# Patient Record
Sex: MALE | Race: WHITE | NOT HISPANIC OR LATINO | Employment: OTHER | URBAN - METROPOLITAN AREA
[De-identification: names, ages, dates, MRNs, and addresses within clinical notes are randomized per-mention and may not be internally consistent; named-entity substitution may affect disease eponyms.]

---

## 2017-03-07 ENCOUNTER — HOSPITAL ENCOUNTER (OUTPATIENT)
Dept: CT IMAGING | Facility: HOSPITAL | Age: 67
Discharge: HOME | End: 2017-03-07
Attending: FAMILY MEDICINE | Admitting: FAMILY MEDICINE

## 2017-03-07 LAB — CREAT BLDA-MCNC: 0.8 MG/DL (ref 0.6–1.3)

## 2017-04-03 ENCOUNTER — HOSPITAL ENCOUNTER (OUTPATIENT)
Dept: PREADMISSION TESTING | Facility: HOSPITAL | Age: 67
Discharge: HOME | End: 2017-04-03
Attending: OTOLARYNGOLOGY | Admitting: OTOLARYNGOLOGY

## 2017-04-03 LAB
ANION GAP SERPL CALC-SCNC: 12.8 MMOL/L (ref 10–20)
APTT BLD: 23.5 SEC (ref 24–31)
BASOPHILS # BLD AUTO: 0 10*3/UL (ref 0–0.2)
BASOPHILS NFR BLD AUTO: 1 % (ref 0–2)
BUN SERPL-MCNC: 16 MG/DL (ref 8–20)
BUN/CREAT SERPL: 17.8 (ref 6.2–20.3)
CALCIUM SERPL-MCNC: 10.6 MG/DL (ref 8.9–10.3)
CHLORIDE SERPL-SCNC: 104 MMOL/L (ref 101–111)
CONV CO2: 27 MMOL/L (ref 22–32)
CREAT UR-MCNC: 0.9 MG/DL (ref 0.7–1.2)
DIFFERENTIAL METHOD BLD: (no result)
EOSINOPHIL # BLD AUTO: 0.2 10*3/UL (ref 0–0.3)
EOSINOPHIL # BLD AUTO: 3 % (ref 0–3)
ERYTHROCYTE [DISTWIDTH] IN BLOOD BY AUTOMATED COUNT: 13.3 % (ref 11.5–14.5)
GLUCOSE SERPL-MCNC: 93 MG/DL (ref 65–99)
HCT VFR BLD AUTO: 42.8 % (ref 40–54)
HGB BLD-MCNC: 15.1 G/DL (ref 14–18)
INR PPP: 1
LYMPHOCYTES # BLD AUTO: 2.5 10*3/UL (ref 0.8–4.8)
LYMPHOCYTES NFR BLD AUTO: 34 % (ref 18–42)
MCH RBC QN AUTO: 30.7 PG (ref 26–32)
MCHC RBC AUTO-ENTMCNC: 35.2 G/DL (ref 32–36)
MCV RBC AUTO: 87.1 FL (ref 80–94)
MONOCYTES # BLD AUTO: 0.8 10*3/UL (ref 0.1–1.3)
MONOCYTES NFR BLD AUTO: 10 % (ref 2–11)
NEUTROPHILS # BLD AUTO: 4 10*3/UL (ref 2.3–8.6)
NEUTROPHILS NFR BLD AUTO: 52 % (ref 50–75)
NRBC BLD AUTO-RTO: 0 /100{WBCS}
NRBC/RBC NFR BLD MANUAL: 0 10*3/UL
PLATELET # BLD AUTO: 160 10*3/UL (ref 150–450)
PMV BLD AUTO: 9 FL (ref 7.4–10.4)
POTASSIUM SERPL-SCNC: 3.8 MMOL/L (ref 3.6–5.1)
PROTHROMBIN TIME: 11.3 SEC (ref 9.6–11.7)
RBC # BLD AUTO: 4.92 10*6/UL (ref 4.6–6)
SODIUM SERPL-SCNC: 140 MMOL/L (ref 136–144)
WBC # BLD AUTO: 7.5 10*3/UL (ref 4.5–11.5)

## 2017-12-07 ENCOUNTER — HOSPITAL ENCOUNTER (OUTPATIENT)
Dept: CARDIOLOGY | Facility: HOSPITAL | Age: 67
Discharge: HOME | End: 2017-12-07
Attending: INTERNAL MEDICINE | Admitting: INTERNAL MEDICINE

## 2018-01-03 ENCOUNTER — HOSPITAL ENCOUNTER (OUTPATIENT)
Dept: OTHER | Facility: HOSPITAL | Age: 68
Discharge: HOME | End: 2018-01-03
Attending: INTERNAL MEDICINE | Admitting: INTERNAL MEDICINE

## 2018-01-03 LAB
ANION GAP SERPL CALC-SCNC: 9.4 MMOL/L (ref 10–20)
APTT BLD: 21 SEC (ref 24–31)
BASOPHILS # BLD AUTO: 0 10*3/UL (ref 0–0.2)
BASOPHILS NFR BLD AUTO: 1 % (ref 0–2)
BUN SERPL-MCNC: 18 MG/DL (ref 8–20)
BUN/CREAT SERPL: 16.4 (ref 6.2–20.3)
CALCIUM SERPL-MCNC: 10.4 MG/DL (ref 8.9–10.3)
CHLORIDE SERPL-SCNC: 104 MMOL/L (ref 101–111)
CHOLEST SERPL-MCNC: 124 MG/DL
CHOLEST/HDLC SERPL: 3.4 {RATIO}
CONV CO2: 29 MMOL/L (ref 22–32)
CONV LDL CHOLESTEROL DIRECT: 64 MG/DL (ref 0–100)
CREAT UR-MCNC: 1.1 MG/DL (ref 0.7–1.2)
DIFFERENTIAL METHOD BLD: (no result)
EOSINOPHIL # BLD AUTO: 0.2 10*3/UL (ref 0–0.3)
EOSINOPHIL # BLD AUTO: 2 % (ref 0–3)
ERYTHROCYTE [DISTWIDTH] IN BLOOD BY AUTOMATED COUNT: 13.5 % (ref 11.5–14.5)
GLUCOSE SERPL-MCNC: 95 MG/DL (ref 65–99)
HCT VFR BLD AUTO: 44.8 % (ref 40–54)
HDLC SERPL-MCNC: 37 MG/DL
HGB BLD-MCNC: 15 G/DL (ref 14–18)
INR PPP: 1
LDLC/HDLC SERPL: 1.7 {RATIO}
LIPID INTERPRETATION: ABNORMAL
LYMPHOCYTES # BLD AUTO: 2.3 10*3/UL (ref 0.8–4.8)
LYMPHOCYTES NFR BLD AUTO: 28 % (ref 18–42)
MCH RBC QN AUTO: 30.1 PG (ref 26–32)
MCHC RBC AUTO-ENTMCNC: 33.4 G/DL (ref 32–36)
MCV RBC AUTO: 90.1 FL (ref 80–94)
MONOCYTES # BLD AUTO: 0.8 10*3/UL (ref 0.1–1.3)
MONOCYTES NFR BLD AUTO: 10 % (ref 2–11)
NEUTROPHILS # BLD AUTO: 4.9 10*3/UL (ref 2.3–8.6)
NEUTROPHILS NFR BLD AUTO: 59 % (ref 50–75)
NRBC BLD AUTO-RTO: 0 /100{WBCS}
NRBC/RBC NFR BLD MANUAL: 0 10*3/UL
PLATELET # BLD AUTO: 191 10*3/UL (ref 150–450)
PMV BLD AUTO: 9.2 FL (ref 7.4–10.4)
POTASSIUM SERPL-SCNC: 4.4 MMOL/L (ref 3.6–5.1)
PROTHROMBIN TIME: 10.8 SEC (ref 9.6–11.7)
RBC # BLD AUTO: 4.96 10*6/UL (ref 4.6–6)
SODIUM SERPL-SCNC: 138 MMOL/L (ref 136–144)
TRIGL SERPL-MCNC: 105 MG/DL
VLDLC SERPL CALC-MCNC: 22.7 MG/DL
WBC # BLD AUTO: 8.3 10*3/UL (ref 4.5–11.5)

## 2019-06-05 ENCOUNTER — CONVERSION ENCOUNTER (OUTPATIENT)
Dept: CARDIOLOGY | Facility: CLINIC | Age: 69
End: 2019-06-05

## 2019-06-05 VITALS
HEART RATE: 49 BPM | WEIGHT: 203.25 LBS | SYSTOLIC BLOOD PRESSURE: 151 MMHG | DIASTOLIC BLOOD PRESSURE: 85 MMHG | OXYGEN SATURATION: 99 %

## 2019-06-06 NOTE — PROGRESS NOTES
Visit Type:  Follow-up Visit  Referring Provider:  Juanita Hall MD  Primary Provider:  Juanita Hall MD    CC:  6 mo f/u hypertension.    History of Present Illness:  68-year-old white male with history of coronary disease status post Coronary artery bypass surgery history of hypertension hyperlipidemia presents to office for follow-up. Currently stable without any symptoms of chest pain or shortness of breath at rest   or exertion.  No complaints of any PND or orthopnea.  No palpitations dizziness syncope or swelling of the feet.  Patient has been taking her medicines regularly.  Recently he was in the hospital for a cardiac catheterization which showed severe 3 vessel   coronary disease.  He had a LIMA to the LAD which was patent and the saphenous graft to the marginal branch and to the PDA and PLV branches were patent.  Patient has diffuse small vessel disease.  Patient is currently stable on medical therapy.      Vital Signs:    Patient Profile:    68 Years Old Male  Height:     67 inches  Weight:     203.25 pounds  (Measured Weight:  203.25 lbs.  oz.)  BMI:        31.83  BSA:        2.04  Pulse rate: 49 / minute  O2 Sat:     99 %  Room Air:   room air without exertion    BP sittin / 85  (left arm)  Cuff size:  regular   Vitals Entered By: Neil Box (2019 9:16 AM)    Medications:  Medications were reviewed with the patient during this visit.    Allergies:   No Known Allergies  Allergies were reviewed with the patient during this visit.    Current Allergies (reviewed today):  No known allergies    Current Medications (including medications started today):   VITAMIN B12 TABLET (CYANOCOBALAMIN TABS) Take 1 tablet by mouth daily  METOPROLOL SUCCINATE  MG ORAL TABLET EXTENDED RELEASE 24 HOUR (METOPROLOL SUCCINATE) p.o. qd  AMLODIPINE BESYLATE 10 MG ORAL TABLET (AMLODIPINE BESYLATE) po qd  TEKTURNA -12.5 MG ORAL TABLET (ALISKIREN-HYDROCHLOROTHIAZIDE) p.o.  qd  MULTIVITAMINS TABS  (MULTIPLE VITAMIN)   ASPIRIN LOW DOSE 81 MG ORAL TABLET (ASPIRIN) Take 1 tablet by mouth daily  FISH OIL 1200 MG ORAL CAPSULE (OMEGA-3 FATTY ACIDS) 2 capsules bid  LIPITOR 20 MG ORAL TABLET (ATORVASTATIN CALCIUM) Take 1 tablet by mouth daily at bedtime      Past Medical History:     Reviewed history from 2016 and no changes required:        Coronary heart disease, s/p CABG        Hypertension        Hyperlipidemia        Myocardial Infarction:         UTI                        No Drug Allergies?     Past Surgical History:     Reviewed history from 2018 and no changes required:        Heart Catherization  2005 (18)        C A B         Tonsillectomy        Ear surgery:         Inverted Papilloma        bladder surgery        Sinus tumur removal (2017)        Sinus Surgery 2018             Family History Summary:      Reviewed history Last on 2018 and no changes required:2019  Cousin (male) - Has Family History of Heart Disease - CABG - Entered On: 2016  Uncle - Has Family History of Heart Disease - CABG - Entered On: 2016  Father - Has Family History of Other Medical Problems - Valve replacement - Entered On: 2016    General Comments - FH:  FH Heart Disease: Father, Mother, Grandmother  FH Hypertension: Father, Mother, Sister  FH High Cholesterol: Father, Mother, Sister, Uncles  FH Diabetes: Mother  FH Other Cancer: Aunt (neck cancer)  FH MI: Cousin, Possibly Father and Uncle  FH Stroke: none    Social History:     Reviewed history from 2018 and no changes required:        Patient has never smoked.        Passive Smoke: N        Alcohol Use: N        Drug Use: N        Regular Exercise: Y                Risk Factors:     Smoked Tobacco Use:  Never smoker  Smokeless Tobacco Use:  Never  Passive smoke exposure:  no  Drug use:  no  Caffeine use:  2 drinks per day  Alcohol use:  no  Exercise:  yes     Times per week:  5     Type of Exercise:   [employer]   Seatbelt use:  100 %  Sun Exposure:  occasionally    Family History Risk Factors:     Family History of MI in females < 65 years old:  no     Family History of MI in males < 55 years old:  no        Review of Systems     General       Denies fever, chills and fatigue.    Eyes       Denies double vision.    CV       Denies chest pain or discomfort, racing/skipping heart beats, lightheadedness, shortness of breath with exertion and swelling of hands or feet.    Resp       Denies cough.    GI       Denies nausea, vomiting, abdominal pain, diarrhea and constipation.    Derm       Denies rash.    Neuro       Denies headaches, numbness, tingling and fainting.    Heme       Denies bleeding and abnormal bruising.      Physical Exam    General:      well developed, well nourished, in no acute distress.    Head:      normocephalic and atraumatic.    Eyes:      PERRL/EOM intact, conjunctiva and sclera clear with out nystagmus.    Neck:      no bruit and no JVD.    Lungs:      clear bilaterally to auscultation.    Heart:      non-displaced PMI, chest non-tender; regular rate and rhythm, S1, S2 without murmurs, rubs, or gallops  Abdomen:      non-tender.    Msk:      no deformity or scoliosis noted of thoracic or lumbar spine.    Pulses:      pulses normal in all 4 extremities.    Extremities:      no pedal edema.    Neurologic:        No focal deficits  Skin:      intact without lesions or rashes.    Psych:      alert and cooperative; normal mood and affect; normal attention span and concentration.      Diabetes Management Exam:      Foot Exam (with socks and/or shoes not present):        Pulses:           pulses normal in all 4 extremities.        Blood Pressure:  Today's BP: 151/85 mm Hg    Labwork:   Most Recent Lab Results:   LDL: 64 mg/dL 01/03/2018        Impression & Recommendations:    Problem # 1:  Hyperlipidemia (ICD-272.4) (FSC62-L79.5)  Patient's lipid levels are followed by primary care doctor  His  updated medication list for this problem includes:     Lipitor 20 Mg Oral Tablet (Atorvastatin calcium) ..... Take 1 tablet by mouth daily at bedtime      Problem # 2:  Hypertension (ICD-401.9) ([id number])   patient blood pressure is currently stable on medications  His updated medication list for this problem includes:     Metoprolol Succinate Er 100 Mg Oral Tablet Extended Release 24 Hour (Metoprolol succinate) ..... P.o. qd     Amlodipine Besylate 10 Mg Oral Tablet (Amlodipine besylate) ..... Po qd     Tekturna Hct 300-12.5 Mg Oral Tablet (Aliskiren-hydrochlorothiazide) ..... P.o.  qd      Problem # 3:  CORONARY ARTERY DISEASE (ICD-414.00) (XWL77-C11.10)    His updated medication list for this problem includes:     Metoprolol Succinate Er 100 Mg Oral Tablet Extended Release 24 Hour (Metoprolol succinate) ..... P.o. qd     Amlodipine Besylate 10 Mg Oral Tablet (Amlodipine besylate) ..... Po qd     Tekturna Hct 300-12.5 Mg Oral Tablet (Aliskiren-hydrochlorothiazide) ..... P.o.  qd     Aspirin Low Dose 81 Mg Oral Tablet (Aspirin) ..... Take 1 tablet by mouth daily     patient had coronary bypass surgery x4 vessels with the lima to LAD and saphenous graft to the marginal branch of the circumflex artery and a sequential graft to the PDA and PLV branch of the right coronary artery.  Patient is currently stable on medical   therapy.      Medications Added to Medication List This Visit:  1)  Fish Oil 1200 Mg Oral Capsule (Omega-3 fatty acids) .... 2 capsules bid  2)  Lipitor 20 Mg Oral Tablet (Atorvastatin calcium) .... Take 1 tablet by mouth daily at bedtime                  Medication Administration    Orders Added:  1)  06145-Chr Vst-Est Level III [CPT-49517]    ]      Electronically signed by Lalo Fine MD on 06/05/2019 at 9:54 AM  ________________________________________________________________________       Disclaimer: Converted Note message may not contain all data elements that existed in the  [email or web address] source system. Please see [employer] for the original note details.

## 2019-06-07 ENCOUNTER — TRANSCRIBE ORDERS (OUTPATIENT)
Dept: CARDIOLOGY | Facility: CLINIC | Age: 69
End: 2019-06-07

## 2019-06-07 DIAGNOSIS — I10 HYPERTENSION, UNSPECIFIED TYPE: Primary | ICD-10-CM

## 2019-06-10 ENCOUNTER — TRANSCRIBE ORDERS (OUTPATIENT)
Dept: CARDIOLOGY | Facility: CLINIC | Age: 69
End: 2019-06-10

## 2019-06-10 DIAGNOSIS — Z95.1 S/P CABG (CORONARY ARTERY BYPASS GRAFT): ICD-10-CM

## 2019-06-10 DIAGNOSIS — I50.9 CONGESTIVE HEART FAILURE, UNSPECIFIED HF CHRONICITY, UNSPECIFIED HEART FAILURE TYPE (HCC): Primary | ICD-10-CM

## 2020-04-15 ENCOUNTER — TELEMEDICINE (OUTPATIENT)
Dept: CARDIOLOGY | Facility: CLINIC | Age: 70
End: 2020-04-15

## 2020-04-15 ENCOUNTER — HOSPITAL ENCOUNTER (OUTPATIENT)
Dept: CARDIOLOGY | Facility: HOSPITAL | Age: 70
Discharge: HOME | End: 2020-04-15
Admitting: INTERNAL MEDICINE

## 2020-04-15 VITALS
SYSTOLIC BLOOD PRESSURE: 160 MMHG | DIASTOLIC BLOOD PRESSURE: 77 MMHG | BODY MASS INDEX: 31.5 KG/M2 | WEIGHT: 196 LBS | HEIGHT: 66 IN

## 2020-04-15 VITALS — BODY MASS INDEX: 38.48 KG/M2 | HEIGHT: 60 IN | WEIGHT: 196 LBS

## 2020-04-15 DIAGNOSIS — I25.10 CORONARY ARTERY DISEASE INVOLVING NATIVE CORONARY ARTERY OF NATIVE HEART WITHOUT ANGINA PECTORIS: ICD-10-CM

## 2020-04-15 DIAGNOSIS — I50.9 CONGESTIVE HEART FAILURE, UNSPECIFIED HF CHRONICITY, UNSPECIFIED HEART FAILURE TYPE (HCC): ICD-10-CM

## 2020-04-15 DIAGNOSIS — Z95.1 S/P CABG (CORONARY ARTERY BYPASS GRAFT): ICD-10-CM

## 2020-04-15 DIAGNOSIS — I10 ESSENTIAL HYPERTENSION: ICD-10-CM

## 2020-04-15 DIAGNOSIS — E78.5 HYPERLIPIDEMIA LDL GOAL <100: Primary | ICD-10-CM

## 2020-04-15 LAB
BH CV ECHO MEAS - ACS: 2.1 CM
BH CV ECHO MEAS - AO MAX PG (FULL): 0.8 MMHG
BH CV ECHO MEAS - AO MAX PG: 6.2 MMHG
BH CV ECHO MEAS - AO MEAN PG (FULL): 0.99 MMHG
BH CV ECHO MEAS - AO MEAN PG: 3.4 MMHG
BH CV ECHO MEAS - AO ROOT AREA: 9.5 CM^2
BH CV ECHO MEAS - AO ROOT DIAM: 3.5 CM
BH CV ECHO MEAS - AO V2 MAX: 124.9 CM/SEC
BH CV ECHO MEAS - AO V2 MEAN: 89.1 CM/SEC
BH CV ECHO MEAS - AO V2 VTI: 28.7 CM
BH CV ECHO MEAS - ASC AORTA: 3 CM
BH CV ECHO MEAS - AVA(I,A): 1.9 CM^2
BH CV ECHO MEAS - AVA(I,D): 1.9 CM^2
BH CV ECHO MEAS - AVA(V,A): 2 CM^2
BH CV ECHO MEAS - AVA(V,D): 2 CM^2
BH CV ECHO MEAS - EDV(CUBED): 175.5 ML
BH CV ECHO MEAS - EDV(MOD-SP4): 87 ML
BH CV ECHO MEAS - EDV(TEICH): 153.6 ML
BH CV ECHO MEAS - EF(CUBED): 63.7 %
BH CV ECHO MEAS - EF(MOD-SP4): 57.5 %
BH CV ECHO MEAS - EF(TEICH): 54.6 %
BH CV ECHO MEAS - ESV(CUBED): 63.6 ML
BH CV ECHO MEAS - ESV(MOD-SP4): 37 ML
BH CV ECHO MEAS - ESV(TEICH): 69.7 ML
BH CV ECHO MEAS - FS: 28.7 %
BH CV ECHO MEAS - IVS/LVPW: 1.8
BH CV ECHO MEAS - IVSD: 1.2 CM
BH CV ECHO MEAS - LA DIMENSION: 4.6 CM
BH CV ECHO MEAS - LA/AO: 1.3
BH CV ECHO MEAS - LV MASS(C)D: 193.4 GRAMS
BH CV ECHO MEAS - LV MAX PG: 5.4 MMHG
BH CV ECHO MEAS - LV MEAN PG: 2.4 MMHG
BH CV ECHO MEAS - LV V1 MAX: 116.6 CM/SEC
BH CV ECHO MEAS - LV V1 MEAN: 72.2 CM/SEC
BH CV ECHO MEAS - LV V1 VTI: 25.5 CM
BH CV ECHO MEAS - LVIDD: 5.6 CM
BH CV ECHO MEAS - LVIDS: 4 CM
BH CV ECHO MEAS - LVOT AREA: 2.2 CM^2
BH CV ECHO MEAS - LVOT DIAM: 1.7 CM
BH CV ECHO MEAS - LVPWD: 0.65 CM
BH CV ECHO MEAS - MV A MAX VEL: 95.7 CM/SEC
BH CV ECHO MEAS - MV DEC SLOPE: 270.9 CM/SEC^2
BH CV ECHO MEAS - MV DEC TIME: 0.3 SEC
BH CV ECHO MEAS - MV E MAX VEL: 82.1 CM/SEC
BH CV ECHO MEAS - MV E/A: 0.86
BH CV ECHO MEAS - MV MAX PG: 4.2 MMHG
BH CV ECHO MEAS - MV MEAN PG: 2 MMHG
BH CV ECHO MEAS - MV V2 MAX: 102.8 CM/SEC
BH CV ECHO MEAS - MV V2 MEAN: 67.9 CM/SEC
BH CV ECHO MEAS - MV V2 VTI: 33.6 CM
BH CV ECHO MEAS - MVA(VTI): 1.6 CM^2
BH CV ECHO MEAS - PA ACC TIME: 0.14 SEC
BH CV ECHO MEAS - PA MAX PG (FULL): 2.8 MMHG
BH CV ECHO MEAS - PA MAX PG: 5.7 MMHG
BH CV ECHO MEAS - PA PR(ACCEL): 15.2 MMHG
BH CV ECHO MEAS - PA V2 MAX: 118.9 CM/SEC
BH CV ECHO MEAS - PI MAX PG: 11.2 MMHG
BH CV ECHO MEAS - PI MAX VEL: 167.5 CM/SEC
BH CV ECHO MEAS - RV MAX PG: 2.8 MMHG
BH CV ECHO MEAS - RV MEAN PG: 1.4 MMHG
BH CV ECHO MEAS - RV V1 MAX: 84 CM/SEC
BH CV ECHO MEAS - RV V1 MEAN: 53.9 CM/SEC
BH CV ECHO MEAS - RV V1 VTI: 19.2 CM
BH CV ECHO MEAS - RVDD: 4 CM
BH CV ECHO MEAS - SV(AO): 272.2 ML
BH CV ECHO MEAS - SV(CUBED): 111.8 ML
BH CV ECHO MEAS - SV(LVOT): 55.4 ML
BH CV ECHO MEAS - SV(MOD-SP4): 50 ML
BH CV ECHO MEAS - SV(TEICH): 83.9 ML
MAXIMAL PREDICTED HEART RATE: 149 BPM
STRESS TARGET HR: 127 BPM

## 2020-04-15 PROCEDURE — 93306 TTE W/DOPPLER COMPLETE: CPT

## 2020-04-15 PROCEDURE — 93306 TTE W/DOPPLER COMPLETE: CPT | Performed by: INTERNAL MEDICINE

## 2020-04-15 PROCEDURE — 99213 OFFICE O/P EST LOW 20 MIN: CPT | Performed by: INTERNAL MEDICINE

## 2020-04-15 RX ORDER — AMLODIPINE BESYLATE 10 MG/1
10 TABLET ORAL
COMMUNITY
Start: 2014-04-21

## 2020-04-15 RX ORDER — AMOXICILLIN 500 MG
2 CAPSULE ORAL EVERY 12 HOURS
COMMUNITY
Start: 2014-04-21

## 2020-04-15 RX ORDER — METOPROLOL SUCCINATE 100 MG/1
100 TABLET, EXTENDED RELEASE ORAL DAILY
COMMUNITY
Start: 2013-06-26

## 2020-04-15 RX ORDER — ALISKIREN HEMIFUMARATE AND HYDROCHLOROTHIAZIDE 300; 12.5 MG/1; MG/1
1 TABLET, FILM COATED ORAL DAILY
COMMUNITY
Start: 2020-04-09

## 2020-04-15 RX ORDER — AMLODIPINE BESYLATE 10 MG/1
1 TABLET ORAL
COMMUNITY
End: 2021-04-07 | Stop reason: SDUPTHER

## 2020-04-15 RX ORDER — ATORVASTATIN CALCIUM 20 MG/1
1 TABLET, FILM COATED ORAL DAILY
COMMUNITY
Start: 2014-04-21

## 2020-07-23 ENCOUNTER — OFFICE VISIT (OUTPATIENT)
Dept: CARDIOLOGY | Facility: CLINIC | Age: 70
End: 2020-07-23

## 2020-07-23 DIAGNOSIS — I25.10 CORONARY ARTERY DISEASE INVOLVING NATIVE CORONARY ARTERY OF NATIVE HEART WITHOUT ANGINA PECTORIS: Primary | ICD-10-CM

## 2020-07-23 DIAGNOSIS — G47.33 OBSTRUCTIVE SLEEP APNEA: ICD-10-CM

## 2020-07-23 DIAGNOSIS — I10 ESSENTIAL HYPERTENSION: ICD-10-CM

## 2020-07-23 DIAGNOSIS — E78.00 PURE HYPERCHOLESTEROLEMIA: ICD-10-CM

## 2020-07-23 PROCEDURE — 99213 OFFICE O/P EST LOW 20 MIN: CPT | Performed by: INTERNAL MEDICINE

## 2020-07-23 NOTE — PROGRESS NOTES
Subjective:     Encounter Date:2020      Patient ID: Jose Daniel Kerns is a 69 y.o. male.    Chief Complaint:  History of Present Illness 69-year-old white male with history of coronary disease status post coronary bypass surgery history of hypertension hyperlipidemia and sleep apnea presents to my office for follow-up.  Patient is currently stable with absence of chest pain or shortness of breath at rest on exertion.  No complaint of any PND orthopnea.  No palpitation dizziness syncope or swelling of the feet.  Patient has been taking all the medicines regularly.  Patient does not smoke.  Patient is trying to exercise regularly he follows a good diet.  There were no vitals taken for this visit.    The following portions of the patient's history were reviewed and updated as appropriate: allergies, current medications, past family history, past medical history, past social history, past surgical history and problem list.  Past Medical History:   Diagnosis Date   • Coronary artery disease    • Coronary artery disease involving native heart without angina pectoris 4/15/2020   • Essential hypertension 4/15/2020   • Hyperlipidemia    • Hyperlipidemia LDL goal <100 4/15/2020   • Hypertension    • Myocardial infarction (CMS/Formerly [facility])      Past Surgical History:   Procedure Laterality Date   • CARDIAC CATHETERIZATION     • CORONARY ARTERY BYPASS GRAFT      4 vessel,      Social History     Socioeconomic History   • Marital status:      Spouse name: Not on file   • Number of children: Not on file   • Years of education: Not on file   • Highest education level: Not on file   Tobacco Use   • Smoking status: Former Smoker     Last attempt to quit: 1970     Years since quittin.5   • Smokeless tobacco: Never Used   Substance and Sexual Activity   • Alcohol use: Yes   • Drug use: Never   • Sexual activity: Defer     Family History   Problem Relation Age of Onset   • Heart disease Father        Current Outpatient  Medications:   •  amLODIPine (NORVASC) 10 MG tablet, Take 10 mg by mouth., Disp: , Rfl:   •  aspirin 81 MG tablet, Take 81 mg by mouth., Disp: , Rfl:   •  atorvastatin (Lipitor) 20 MG tablet, Take 1 tablet by mouth Daily., Disp: , Rfl:   •  metoprolol succinate XL (TOPROL-XL) 100 MG 24 hr tablet, Take 100 mg by mouth Daily., Disp: , Rfl:   •  Omega-3 Fatty Acids (FISH OIL) 1200 MG capsule capsule, 2 capsules Every 12 (Twelve) Hours., Disp: , Rfl:   •  TEKTURNA -12.5 MG tablet, Take 1 tablet by mouth Daily., Disp: , Rfl:   •  amLODIPine (NORVASC) 10 MG tablet, Take 1 tablet by mouth., Disp: , Rfl:   No Known Allergies    Review of Systems   Constitution: Negative for fever and malaise/fatigue.   HENT: Negative for congestion and hearing loss.    Eyes: Negative for double vision and visual disturbance.   Cardiovascular: Negative for chest pain, claudication, dyspnea on exertion, leg swelling, palpitations and syncope.   Respiratory: Negative for cough and shortness of breath.    Endocrine: Negative for cold intolerance.   Skin: Negative for color change and rash.   Musculoskeletal: Negative for arthritis and joint pain.   Gastrointestinal: Negative for abdominal pain and heartburn.   Genitourinary: Negative for hematuria.   Neurological: Negative for excessive daytime sleepiness and dizziness.   Psychiatric/Behavioral: Negative for depression. The patient is not nervous/anxious.    All other systems reviewed and are negative.             Objective:     Physical Exam   Constitutional: He appears well-developed and well-nourished.   HENT:   Head: Normocephalic and atraumatic.   Eyes: Conjunctivae are normal. No scleral icterus.   Neck: Normal range of motion. Neck supple. No JVD present. Carotid bruit is not present.   Cardiovascular: Normal rate, regular rhythm, S1 normal, S2 normal, normal heart sounds and intact distal pulses. PMI is not displaced.   Pulmonary/Chest: Effort normal and breath sounds normal. He  has no wheezes. He has no rales.   Abdominal: Soft. Bowel sounds are normal.   Neurological: He is alert. He has normal strength.   Skin: Skin is warm and dry. No rash noted.       Procedures    Lab Review:       Assessment:          Diagnosis Plan   1. Coronary artery disease involving native coronary artery of native heart without angina pectoris     2. Essential hypertension     3. Pure hypercholesterolemia     4. Obstructive sleep apnea            Plan:       Patient has history of coronary status post bypass surgery 4 vessels with a LIMA to LAD and saphenous graft to the marginal branch diagonal branch and RCA.  Patient has normal LV function  Patient has sleep apnea and right ventricular dilatation  Patient blood pressure and heart rate stable  Patient lipids are followed by the primary care doctor.  Continue current medicines and follow in 6

## 2020-07-28 ENCOUNTER — TELEPHONE (OUTPATIENT)
Dept: CARDIOLOGY | Facility: CLINIC | Age: 70
End: 2020-07-28

## 2020-07-28 NOTE — TELEPHONE ENCOUNTER
Called pt and let him know that Dr. Hall does not order sleep study but to contact his pcp and see if they would order. Pt understood.

## 2020-07-28 NOTE — TELEPHONE ENCOUNTER
Patient left message about sleep study. He said Dr. Hall was going to talk to Dr. Hall about this. Patient asked for call back with date/time?

## 2021-04-07 ENCOUNTER — OFFICE VISIT (OUTPATIENT)
Dept: CARDIOLOGY | Facility: CLINIC | Age: 71
End: 2021-04-07

## 2021-04-07 VITALS
HEART RATE: 52 BPM | DIASTOLIC BLOOD PRESSURE: 79 MMHG | SYSTOLIC BLOOD PRESSURE: 146 MMHG | HEIGHT: 66 IN | OXYGEN SATURATION: 95 % | TEMPERATURE: 97.8 F | BODY MASS INDEX: 33.11 KG/M2 | WEIGHT: 206 LBS

## 2021-04-07 DIAGNOSIS — G47.33 OBSTRUCTIVE SLEEP APNEA: ICD-10-CM

## 2021-04-07 DIAGNOSIS — I10 ESSENTIAL HYPERTENSION: ICD-10-CM

## 2021-04-07 DIAGNOSIS — I25.10 CORONARY ARTERY DISEASE INVOLVING NATIVE CORONARY ARTERY OF NATIVE HEART WITHOUT ANGINA PECTORIS: Primary | ICD-10-CM

## 2021-04-07 DIAGNOSIS — E78.00 PURE HYPERCHOLESTEROLEMIA: ICD-10-CM

## 2021-04-07 PROCEDURE — 99214 OFFICE O/P EST MOD 30 MIN: CPT | Performed by: INTERNAL MEDICINE

## 2021-04-07 NOTE — PROGRESS NOTES
"    Subjective:     Encounter Date:04/07/2021      Patient ID: Jose Daniel Kerns is a 70 y.o. male.    Chief Complaint:  History of Present Illness 68-year-old white male with history of coronary disease history of hypertension hyperlipidemia and sleep apnea presents to my office for follow-up.  Patient is currently stable without any symptoms of chest pain or shortness of breath at rest or exertion.  No complains any PND orthopnea.  No palpitation dizziness syncope or swelling of the feet.  Patient has been taking all the medicines regularly.  Patient does not smoke.  Patient is trying to exercise regular.  Patient follows a good diet.    The following portions of the patient's history were reviewed and updated as appropriate: allergies, current medications, past family history, past medical history, past social history, past surgical history and problem list.  Past Medical History:   Diagnosis Date   • Coronary artery disease    • Coronary artery disease involving native heart without angina pectoris 4/15/2020   • Essential hypertension 4/15/2020   • Hyperlipidemia    • Hyperlipidemia LDL goal <100 4/15/2020   • Hypertension    • Myocardial infarction (CMS/HCC)      Past Surgical History:   Procedure Laterality Date   • CARDIAC CATHETERIZATION     • CORONARY ARTERY BYPASS GRAFT      4 vessel, 2005   • SINUS SURGERY       /79   Pulse 52   Temp 97.8 °F (36.6 °C)   Ht 167.6 cm (66\")   Wt 93.4 kg (206 lb)   SpO2 95%   BMI 33.25 kg/m²   Family History   Problem Relation Age of Onset   • Heart disease Father        Current Outpatient Medications:   •  amLODIPine (NORVASC) 10 MG tablet, Take 10 mg by mouth., Disp: , Rfl:   •  aspirin 81 MG tablet, Take 81 mg by mouth., Disp: , Rfl:   •  atorvastatin (Lipitor) 20 MG tablet, Take 1 tablet by mouth Daily., Disp: , Rfl:   •  metoprolol succinate XL (TOPROL-XL) 100 MG 24 hr tablet, Take 100 mg by mouth Daily., Disp: , Rfl:   •  Omega-3 Fatty Acids (FISH OIL) 1200 MG " capsule capsule, 2 capsules Every 12 (Twelve) Hours., Disp: , Rfl:   •  TEKTURNA -12.5 MG tablet, Take 1 tablet by mouth Daily., Disp: , Rfl:   No Known Allergies  Social History     Socioeconomic History   • Marital status:      Spouse name: Not on file   • Number of children: Not on file   • Years of education: Not on file   • Highest education level: Not on file   Tobacco Use   • Smoking status: Former Smoker     Quit date: 1970     Years since quittin.2   • Smokeless tobacco: Never Used   Substance and Sexual Activity   • Alcohol use: Yes   • Drug use: Never   • Sexual activity: Defer     Review of Systems   Constitutional: Negative for fever and malaise/fatigue.   Cardiovascular: Negative for chest pain, dyspnea on exertion, leg swelling and palpitations.   Respiratory: Negative for cough and shortness of breath.    Skin: Negative for rash.   Gastrointestinal: Negative for abdominal pain, nausea and vomiting.   Neurological: Negative for focal weakness, headaches, light-headedness and numbness.   All other systems reviewed and are negative.             Objective:     Constitutional:       Appearance: Well-developed.   Eyes:      General: No scleral icterus.     Conjunctiva/sclera: Conjunctivae normal.   HENT:      Head: Normocephalic and atraumatic.   Neck:      Vascular: No carotid bruit or JVD.   Pulmonary:      Effort: Pulmonary effort is normal.      Breath sounds: Normal breath sounds. No wheezing. No rales.   Cardiovascular:      Normal rate. Regular rhythm.   Pulses:     Intact distal pulses.   Abdominal:      General: Bowel sounds are normal.      Palpations: Abdomen is soft.   Musculoskeletal:      Cervical back: Normal range of motion and neck supple. Skin:     General: Skin is warm and dry.      Findings: No rash.   Neurological:      Mental Status: Alert.       Procedures    Lab Review:         MDM #1 coronary disease  Patient had coronary bypass surgery x3 vessels with a LIMA to  LAD and saphenous graft to the marginal branch and RCA and has normal be systolic function  2.  Hypertension  Patient blood pressure currently stable on amlodipine metoprolol and Tekturna  3.  Hyperlipidemia  Patient lipids are followed by primary doctor and is on Lipitor 40 g once a day  4.  Sleep apnea  Patient has obstructive sleep apnea uses CPAP machine.

## 2021-10-20 ENCOUNTER — OFFICE VISIT (OUTPATIENT)
Dept: CARDIOLOGY | Facility: CLINIC | Age: 71
End: 2021-10-20

## 2021-10-20 VITALS
BODY MASS INDEX: 31.98 KG/M2 | HEART RATE: 54 BPM | DIASTOLIC BLOOD PRESSURE: 80 MMHG | OXYGEN SATURATION: 96 % | WEIGHT: 199 LBS | SYSTOLIC BLOOD PRESSURE: 152 MMHG | HEIGHT: 66 IN

## 2021-10-20 DIAGNOSIS — E78.00 PURE HYPERCHOLESTEROLEMIA: ICD-10-CM

## 2021-10-20 DIAGNOSIS — I25.10 CORONARY ARTERY DISEASE INVOLVING NATIVE CORONARY ARTERY OF NATIVE HEART WITHOUT ANGINA PECTORIS: Primary | ICD-10-CM

## 2021-10-20 DIAGNOSIS — G47.33 OBSTRUCTIVE SLEEP APNEA: ICD-10-CM

## 2021-10-20 DIAGNOSIS — I10 ESSENTIAL HYPERTENSION: ICD-10-CM

## 2021-10-20 PROCEDURE — 99214 OFFICE O/P EST MOD 30 MIN: CPT | Performed by: INTERNAL MEDICINE

## 2021-10-20 NOTE — PROGRESS NOTES
"    Subjective:     Encounter Date:10/20/2021      Patient ID: Jose Daniel Kerns is a 71 y.o. male.    Chief Complaint:  History of Present Illness 71-year-old white male with history of coronary disease history of hypertension hyperlipidemia sleep apnea presents to my office for follow-up.  Pain is currently stable without any symptoms of chest pain or shortness of breath at rest on exertion.  No complaint of any PND orthopnea.  No palpitation dizziness syncope or swelling of the feet.  Patient has been taking all the medicines regularly.  Patient does not smoke.  Pain is treated surgically.  Patient follows a good diet.    The following portions of the patient's history were reviewed and updated as appropriate: allergies, current medications, past family history, past medical history, past social history, past surgical history and problem list.  Past Medical History:   Diagnosis Date   • Coronary artery disease    • Coronary artery disease involving native heart without angina pectoris 4/15/2020   • Essential hypertension 4/15/2020   • Hyperlipidemia    • Hyperlipidemia LDL goal <100 4/15/2020   • Hypertension    • Myocardial infarction (HCC)      Past Surgical History:   Procedure Laterality Date   • CARDIAC CATHETERIZATION     • CORONARY ARTERY BYPASS GRAFT      4 vessel, 2005   • SINUS SURGERY       /80 (BP Location: Left arm, Patient Position: Sitting)   Pulse 54   Ht 167.6 cm (66\")   Wt 90.3 kg (199 lb)   SpO2 96%   BMI 32.12 kg/m²   Family History   Problem Relation Age of Onset   • Heart disease Father        Current Outpatient Medications:   •  amLODIPine (NORVASC) 10 MG tablet, Take 10 mg by mouth., Disp: , Rfl:   •  aspirin 81 MG tablet, Take 81 mg by mouth., Disp: , Rfl:   •  atorvastatin (Lipitor) 20 MG tablet, Take 1 tablet by mouth Daily., Disp: , Rfl:   •  metoprolol succinate XL (TOPROL-XL) 100 MG 24 hr tablet, Take 100 mg by mouth Daily., Disp: , Rfl:   •  Omega-3 Fatty Acids (FISH OIL) " 1200 MG capsule capsule, 2 capsules Every 12 (Twelve) Hours., Disp: , Rfl:   •  TEKTURNA -12.5 MG tablet, Take 1 tablet by mouth Daily., Disp: , Rfl:   •  ZINC OXIDE PO, Take  by mouth., Disp: , Rfl:   No Known Allergies  Social History     Socioeconomic History   • Marital status:    Tobacco Use   • Smoking status: Former Smoker     Quit date: 1970     Years since quittin.8   • Smokeless tobacco: Never Used   Substance and Sexual Activity   • Alcohol use: Yes   • Drug use: Never   • Sexual activity: Defer     Review of Systems   Constitutional: Negative for fever and malaise/fatigue.   Cardiovascular: Negative for chest pain, dyspnea on exertion and palpitations.   Respiratory: Negative for cough and shortness of breath.    Skin: Negative for rash.   Gastrointestinal: Negative for abdominal pain, nausea and vomiting.   Neurological: Negative for focal weakness and headaches.   All other systems reviewed and are negative.             Objective:     Constitutional:       Appearance: Well-developed.   Eyes:      General: No scleral icterus.     Conjunctiva/sclera: Conjunctivae normal.   HENT:      Head: Normocephalic and atraumatic.   Neck:      Vascular: No carotid bruit or JVD.   Pulmonary:      Effort: Pulmonary effort is normal.      Breath sounds: Normal breath sounds. No wheezing. No rales.   Cardiovascular:      Normal rate. Regular rhythm.   Pulses:     Intact distal pulses.   Abdominal:      General: Bowel sounds are normal.      Palpations: Abdomen is soft.   Musculoskeletal:      Cervical back: Normal range of motion and neck supple. Skin:     General: Skin is warm and dry.      Findings: No rash.   Neurological:      Mental Status: Alert.       Procedures    Lab Review:         MDM  1.  Coronary disease  Patient had coronary bypass surgery for vessels with a LIMA to LAD and saphenous graft to the diagonal branch marginal branch and RCA and has normal diastolic function is currently  stable on medications  2.  Hypertension  Patient blood pressure is currently stable on metoprolol amlodipine and Tekturna  3.  Hyperlipidemia  Pains on Lipitor the lipid levels are well within normal range  4.  Sleep apnea  Patient has sleep apnea and uses a CPAP machine.      Patient's previous medical records, labs, and EKG were reviewed and discussed with the patient at today's visit.

## 2022-06-02 ENCOUNTER — OFFICE VISIT (OUTPATIENT)
Dept: CARDIOLOGY | Facility: CLINIC | Age: 72
End: 2022-06-02

## 2022-06-02 ENCOUNTER — TELEPHONE (OUTPATIENT)
Dept: CARDIOLOGY | Facility: CLINIC | Age: 72
End: 2022-06-02

## 2022-06-02 VITALS
SYSTOLIC BLOOD PRESSURE: 156 MMHG | DIASTOLIC BLOOD PRESSURE: 79 MMHG | HEART RATE: 55 BPM | WEIGHT: 202 LBS | OXYGEN SATURATION: 97 % | BODY MASS INDEX: 32.47 KG/M2 | HEIGHT: 66 IN

## 2022-06-02 DIAGNOSIS — I25.10 CORONARY ARTERY DISEASE INVOLVING NATIVE CORONARY ARTERY OF NATIVE HEART WITHOUT ANGINA PECTORIS: Primary | ICD-10-CM

## 2022-06-02 DIAGNOSIS — E78.00 PURE HYPERCHOLESTEROLEMIA: ICD-10-CM

## 2022-06-02 DIAGNOSIS — G47.33 OBSTRUCTIVE SLEEP APNEA: ICD-10-CM

## 2022-06-02 DIAGNOSIS — I10 ESSENTIAL HYPERTENSION: ICD-10-CM

## 2022-06-02 PROCEDURE — 99214 OFFICE O/P EST MOD 30 MIN: CPT | Performed by: INTERNAL MEDICINE

## 2022-06-02 NOTE — PROGRESS NOTES
"    Subjective:     Encounter Date:06/02/2022      Patient ID: Jose Daniel Kerns is a 71 y.o. male.    Chief Complaint:  History of Present Illness 71-year-old white male with history of coronary disease history of hypertension hyperlipidemia sleep apnea presents to my office for follow-up.  Patient is currently stable without any symptoms of chest pain or shortness of breath at rest on exertion but no complains any PND orthopnea.  No palpitation dizziness syncope or swelling of the feet.  He has been taking his medicines regularly.  He does not smoke.  He is trying to exercise regularly follows a good diet.    The following portions of the patient's history were reviewed and updated as appropriate: allergies, current medications, past family history, past medical history, past social history, past surgical history and problem list.  Past Medical History:   Diagnosis Date   • Coronary artery disease    • Coronary artery disease involving native heart without angina pectoris 4/15/2020   • Essential hypertension 4/15/2020   • Hyperlipidemia    • Hyperlipidemia LDL goal <100 4/15/2020   • Hypertension    • Myocardial infarction (HCC)      Past Surgical History:   Procedure Laterality Date   • CARDIAC CATHETERIZATION     • CORONARY ARTERY BYPASS GRAFT      4 vessel, 2005   • SINUS SURGERY       /79 (BP Location: Left arm, Patient Position: Sitting, Cuff Size: Adult)   Pulse 55   Ht 167.6 cm (66\")   Wt 91.6 kg (202 lb)   SpO2 97%   BMI 32.60 kg/m²   Family History   Problem Relation Age of Onset   • Heart disease Father        Current Outpatient Medications:   •  amLODIPine (NORVASC) 10 MG tablet, Take 10 mg by mouth., Disp: , Rfl:   •  aspirin 81 MG tablet, Take 81 mg by mouth., Disp: , Rfl:   •  atorvastatin (LIPITOR) 20 MG tablet, Take 1 tablet by mouth Daily., Disp: , Rfl:   •  metoprolol succinate XL (TOPROL-XL) 100 MG 24 hr tablet, Take 100 mg by mouth Daily., Disp: , Rfl:   •  Omega-3 Fatty Acids (FISH " OIL) 1200 MG capsule capsule, 2 capsules Every 12 (Twelve) Hours., Disp: , Rfl:   •  TEKTURNA -12.5 MG tablet, Take 1 tablet by mouth Daily., Disp: , Rfl:   •  ZINC OXIDE PO, Take  by mouth., Disp: , Rfl:   No Known Allergies  Social History     Socioeconomic History   • Marital status:    Tobacco Use   • Smoking status: Former Smoker     Quit date: 1970     Years since quittin.4   • Smokeless tobacco: Never Used   Substance and Sexual Activity   • Alcohol use: Yes   • Drug use: Never   • Sexual activity: Defer     Review of Systems   Constitutional: Negative for fever and malaise/fatigue.   Cardiovascular: Negative for chest pain, dyspnea on exertion and palpitations.   Respiratory: Negative for cough and shortness of breath.    Skin: Negative for rash.   Gastrointestinal: Negative for abdominal pain, nausea and vomiting.   Neurological: Negative for focal weakness and headaches.   All other systems reviewed and are negative.             Objective:     Constitutional:       Appearance: Well-developed.   Eyes:      General: No scleral icterus.     Conjunctiva/sclera: Conjunctivae normal.   HENT:      Head: Normocephalic and atraumatic.   Neck:      Vascular: No carotid bruit or JVD.   Pulmonary:      Effort: Pulmonary effort is normal.      Breath sounds: Normal breath sounds. No wheezing. No rales.   Cardiovascular:      Normal rate. Regular rhythm.   Pulses:     Intact distal pulses.   Abdominal:      General: Bowel sounds are normal.      Palpations: Abdomen is soft.   Musculoskeletal:      Cervical back: Normal range of motion and neck supple. Skin:     General: Skin is warm and dry.      Findings: No rash.   Neurological:      Mental Status: Alert.       Procedures    Lab Review:         MDM  1.  Coronary disease  Patient has nonobstructive disease now with normal B systolic function is currently stable on medications  2.  Hypertension  Patient blood pressure is currently stable on multiple  medications  3.  Hyperlipidemia  Patient is on atorvastatin the lipid levels are well within normal limits  4.  Sleep apnea  Patient has sleep apnea but does not use a CPAP machine      Patient's previous medical records, labs, and EKG were reviewed and discussed with the patient at today's visit.

## 2022-06-02 NOTE — TELEPHONE ENCOUNTER
Patient was given Tekturna by his nurse practitioner Wanda Zamudio and have asked him to follow-up with her to change the medicine to losartan hydrochlorothiazide

## 2022-06-02 NOTE — TELEPHONE ENCOUNTER
Needs Losartan sent to [facility] mail order. He is on Tekturna now. I was having a hard time understanding him. I think he got letter from [facility] saying he needs to switch to losartan.

## 2023-02-01 ENCOUNTER — OFFICE VISIT (OUTPATIENT)
Dept: CARDIOLOGY | Facility: CLINIC | Age: 73
End: 2023-02-01
Payer: MEDICARE

## 2023-02-01 VITALS
HEIGHT: 66 IN | HEART RATE: 57 BPM | SYSTOLIC BLOOD PRESSURE: 136 MMHG | BODY MASS INDEX: 32.62 KG/M2 | WEIGHT: 203 LBS | DIASTOLIC BLOOD PRESSURE: 61 MMHG | OXYGEN SATURATION: 98 %

## 2023-02-01 DIAGNOSIS — E78.00 PURE HYPERCHOLESTEROLEMIA: ICD-10-CM

## 2023-02-01 DIAGNOSIS — I25.10 CORONARY ARTERY DISEASE INVOLVING NATIVE CORONARY ARTERY OF NATIVE HEART WITHOUT ANGINA PECTORIS: Primary | ICD-10-CM

## 2023-02-01 DIAGNOSIS — G47.33 OBSTRUCTIVE SLEEP APNEA: ICD-10-CM

## 2023-02-01 DIAGNOSIS — I10 ESSENTIAL HYPERTENSION: ICD-10-CM

## 2023-02-01 PROCEDURE — 99214 OFFICE O/P EST MOD 30 MIN: CPT | Performed by: INTERNAL MEDICINE

## 2023-02-01 NOTE — PROGRESS NOTES
"    Subjective:     Encounter Date:02/01/2023      Patient ID: Jose Daniel Kerns is a 72 y.o. male.    Chief Complaint:  History of Present Illness 72-year-old white male with history of coronary disease status post coronary artery bypass surgery history of hypertension hyperlipidemia and sleep apnea presents to my office for follow-up.  Patient is currently stable without any symptoms of chest pain or shortness of breath at rest on exertion.  No complains any PND orthopnea.  No palpitation dizziness syncope or swelling of the feet.  Patient has been taking all her medicines regularly.  Patient does not smoke    The following portions of the patient's history were reviewed and updated as appropriate: allergies, current medications, past family history, past medical history, past social history, past surgical history and problem list.  Past Medical History:   Diagnosis Date   • Coronary artery disease    • Coronary artery disease involving native heart without angina pectoris 4/15/2020   • Essential hypertension 4/15/2020   • Hyperlipidemia    • Hyperlipidemia LDL goal <100 4/15/2020   • Hypertension    • Myocardial infarction (HCC)      Past Surgical History:   Procedure Laterality Date   • CARDIAC CATHETERIZATION     • CORONARY ARTERY BYPASS GRAFT      4 vessel, 2005   • SINUS SURGERY       /61   Pulse 57   Ht 167.6 cm (65.98\")   Wt 92.1 kg (203 lb)   SpO2 98%   BMI 32.78 kg/m²   Family History   Problem Relation Age of Onset   • Heart disease Father        Current Outpatient Medications:   •  amLODIPine (NORVASC) 10 MG tablet, Take 10 mg by mouth., Disp: , Rfl:   •  aspirin 81 MG tablet, Take 81 mg by mouth., Disp: , Rfl:   •  atorvastatin (LIPITOR) 20 MG tablet, Take 1 tablet by mouth Daily., Disp: , Rfl:   •  metoprolol succinate XL (TOPROL-XL) 100 MG 24 hr tablet, Take 100 mg by mouth Daily., Disp: , Rfl:   •  Omega-3 Fatty Acids (FISH OIL) 1200 MG capsule capsule, 2 capsules Every 12 (Twelve) Hours., " Disp: , Rfl:   •  ZINC OXIDE PO, Take  by mouth., Disp: , Rfl:   •  TEKTURNA -12.5 MG tablet, Take 1 tablet by mouth Daily., Disp: , Rfl:   No Known Allergies  Social History     Socioeconomic History   • Marital status:    Tobacco Use   • Smoking status: Never     Passive exposure: Yes   • Smokeless tobacco: Never   Substance and Sexual Activity   • Alcohol use: Not Currently   • Drug use: Never   • Sexual activity: Defer     Review of Systems   Constitutional: Negative for malaise/fatigue.   Cardiovascular: Negative for chest pain, dyspnea on exertion, leg swelling and palpitations.   Respiratory: Negative for cough and shortness of breath.    Gastrointestinal: Negative for abdominal pain, nausea and vomiting.   Neurological: Negative for dizziness, focal weakness, headaches, light-headedness and numbness.   All other systems reviewed and are negative.             Objective:     Constitutional:       Appearance: Well-developed.   Eyes:      General: No scleral icterus.     Conjunctiva/sclera: Conjunctivae normal.   HENT:      Head: Normocephalic and atraumatic.   Neck:      Vascular: No carotid bruit or JVD.   Pulmonary:      Effort: Pulmonary effort is normal.      Breath sounds: Normal breath sounds. No wheezing. No rales.   Cardiovascular:      Normal rate. Regular rhythm.   Pulses:     Intact distal pulses.   Abdominal:      General: Bowel sounds are normal.      Palpations: Abdomen is soft.   Musculoskeletal:      Cervical back: Normal range of motion and neck supple. Skin:     General: Skin is warm and dry.      Findings: No rash.   Neurological:      Mental Status: Alert.       Procedures    Lab Review:         MDM  1.  Coronary disease  Patient had coronary bypass surgery x4 vessels with a LIMA to LAD and saphenous graft to the diagonal branch marginal branch and RCA and has normal LV systolic function  2.  Hypertension  Patient blood pressure currently stable on amlodipine metoprolol and  Tekturna  3.  Hyperlipidemia  Pains on Lipitor and the lipid levels are well within normal limits  4.  Sleep apnea  Patient has sleep apnea and uses a CPAP machine    Patient's previous medical records, labs, and EKG were reviewed and discussed with the patient at today's visit.

## 2023-08-16 ENCOUNTER — OFFICE VISIT (OUTPATIENT)
Dept: CARDIOLOGY | Facility: CLINIC | Age: 73
End: 2023-08-16
Payer: MEDICARE

## 2023-08-16 VITALS
DIASTOLIC BLOOD PRESSURE: 82 MMHG | BODY MASS INDEX: 32.95 KG/M2 | OXYGEN SATURATION: 98 % | WEIGHT: 205 LBS | HEART RATE: 67 BPM | HEIGHT: 66 IN | SYSTOLIC BLOOD PRESSURE: 121 MMHG

## 2023-08-16 DIAGNOSIS — G47.33 OBSTRUCTIVE SLEEP APNEA: ICD-10-CM

## 2023-08-16 DIAGNOSIS — E78.00 PURE HYPERCHOLESTEROLEMIA: ICD-10-CM

## 2023-08-16 DIAGNOSIS — I25.10 CORONARY ARTERY DISEASE INVOLVING NATIVE CORONARY ARTERY OF NATIVE HEART WITHOUT ANGINA PECTORIS: Primary | ICD-10-CM

## 2023-08-16 DIAGNOSIS — I10 ESSENTIAL HYPERTENSION: ICD-10-CM

## 2023-08-16 PROCEDURE — 1159F MED LIST DOCD IN RCRD: CPT | Performed by: INTERNAL MEDICINE

## 2023-08-16 PROCEDURE — 3079F DIAST BP 80-89 MM HG: CPT | Performed by: INTERNAL MEDICINE

## 2023-08-16 PROCEDURE — 3074F SYST BP LT 130 MM HG: CPT | Performed by: INTERNAL MEDICINE

## 2023-08-16 PROCEDURE — 1160F RVW MEDS BY RX/DR IN RCRD: CPT | Performed by: INTERNAL MEDICINE

## 2023-08-16 PROCEDURE — 99214 OFFICE O/P EST MOD 30 MIN: CPT | Performed by: INTERNAL MEDICINE

## 2023-08-16 NOTE — PROGRESS NOTES
"    Subjective:     Encounter Date:08/16/2023      Patient ID: Jose Daniel Kerns is a 72 y.o. male.    Chief Complaint:  History of Present Illness 72-year-old white male with history of coronary status post coronary bypass surgery history of hypertension hyperlipidemia and sleep apnea presents to my office for a follow-up.  Patient is currently stable without any symptoms of chest pain or shortness of breath at rest on exertion.  No complains any PND orthopnea.  No palpitation dizziness syncope or swelling of the feet.  He is taking his medicine regularly but he does not smoke.    The following portions of the patient's history were reviewed and updated as appropriate: allergies, current medications, past family history, past medical history, past social history, past surgical history, and problem list.  Past Medical History:   Diagnosis Date    Coronary artery disease     Coronary artery disease involving native heart without angina pectoris 4/15/2020    Essential hypertension 4/15/2020    Hyperlipidemia     Hyperlipidemia LDL goal <100 4/15/2020    Hypertension     Myocardial infarction      Past Surgical History:   Procedure Laterality Date    CARDIAC CATHETERIZATION      CORONARY ARTERY BYPASS GRAFT      4 vessel, 2005    SINUS SURGERY       /82   Pulse 67   Ht 167.6 cm (65.98\")   Wt 93 kg (205 lb)   SpO2 98%   BMI 33.10 kg/mý   Family History   Problem Relation Age of Onset    Heart disease Father        Current Outpatient Medications:     amLODIPine (NORVASC) 10 MG tablet, Take 1 tablet by mouth., Disp: , Rfl:     aspirin 81 MG tablet, Take 1 tablet by mouth., Disp: , Rfl:     atorvastatin (LIPITOR) 20 MG tablet, Take 1 tablet by mouth Daily., Disp: , Rfl:     metoprolol succinate XL (TOPROL-XL) 100 MG 24 hr tablet, Take 1 tablet by mouth Daily., Disp: , Rfl:     Omega-3 Fatty Acids (FISH OIL) 1200 MG capsule capsule, 2 capsules Every 12 (Twelve) Hours., Disp: , Rfl:     TEKTURNA -12.5 MG " tablet, Take 1 tablet by mouth Daily., Disp: , Rfl:     ZINC OXIDE PO, Take  by mouth., Disp: , Rfl:   No Known Allergies  Social History     Socioeconomic History    Marital status:    Tobacco Use    Smoking status: Never     Passive exposure: Yes    Smokeless tobacco: Never   Substance and Sexual Activity    Alcohol use: Not Currently    Drug use: Never    Sexual activity: Defer     Review of Systems   Constitutional: Negative for malaise/fatigue.   Cardiovascular:  Negative for chest pain, dyspnea on exertion, leg swelling and palpitations.   Respiratory:  Negative for cough and shortness of breath.    Gastrointestinal:  Negative for abdominal pain, nausea and vomiting.   Neurological:  Positive for numbness. Negative for dizziness, focal weakness, headaches and light-headedness.   All other systems reviewed and are negative.           Objective:     Constitutional:       Appearance: Well-developed.   Eyes:      General: No scleral icterus.     Conjunctiva/sclera: Conjunctivae normal.   HENT:      Head: Normocephalic and atraumatic.   Neck:      Vascular: No carotid bruit or JVD.   Pulmonary:      Effort: Pulmonary effort is normal.      Breath sounds: Normal breath sounds. No wheezing. No rales.   Cardiovascular:      Normal rate. Regular rhythm.   Pulses:     Intact distal pulses.   Abdominal:      General: Bowel sounds are normal.      Palpations: Abdomen is soft.   Musculoskeletal:      Cervical back: Normal range of motion and neck supple. Skin:     General: Skin is warm and dry.      Findings: No rash.   Neurological:      Mental Status: Alert.     Procedures    Lab Review:         MDM    #1 coronary disease  Patient had coronary bypass surgery times 4 vessels with a LIMA to LAD and saphenous graft to the marginal branch diagonal branch and RCA and is currently stable on medications  2.  Hypertension  Patient blood pressure currently stable on Tekturna and metoprolol and amlodipine  3.   Hyperlipidemia  Patient is on atorvastatin the lipid levels are well within normal limits  4.  Sleep apnea  Patient is sleep apnea and uses a CPAP machine    Patient's previous medical records, labs, and EKG were reviewed and discussed with the patient at today's visit.

## 2024-02-21 ENCOUNTER — OFFICE VISIT (OUTPATIENT)
Dept: CARDIOLOGY | Facility: CLINIC | Age: 74
End: 2024-02-21
Payer: MEDICARE

## 2024-02-21 VITALS
HEART RATE: 54 BPM | WEIGHT: 198 LBS | DIASTOLIC BLOOD PRESSURE: 76 MMHG | HEIGHT: 66 IN | OXYGEN SATURATION: 99 % | SYSTOLIC BLOOD PRESSURE: 123 MMHG | BODY MASS INDEX: 31.82 KG/M2

## 2024-02-21 DIAGNOSIS — I25.10 CORONARY ARTERY DISEASE INVOLVING NATIVE CORONARY ARTERY OF NATIVE HEART WITHOUT ANGINA PECTORIS: Primary | ICD-10-CM

## 2024-02-21 DIAGNOSIS — I10 ESSENTIAL HYPERTENSION: ICD-10-CM

## 2024-02-21 DIAGNOSIS — G47.33 OBSTRUCTIVE SLEEP APNEA: ICD-10-CM

## 2024-02-21 DIAGNOSIS — E78.00 PURE HYPERCHOLESTEROLEMIA: ICD-10-CM

## 2024-02-21 PROCEDURE — 1159F MED LIST DOCD IN RCRD: CPT | Performed by: INTERNAL MEDICINE

## 2024-02-21 PROCEDURE — 99214 OFFICE O/P EST MOD 30 MIN: CPT | Performed by: INTERNAL MEDICINE

## 2024-02-21 PROCEDURE — 3074F SYST BP LT 130 MM HG: CPT | Performed by: INTERNAL MEDICINE

## 2024-02-21 PROCEDURE — 1160F RVW MEDS BY RX/DR IN RCRD: CPT | Performed by: INTERNAL MEDICINE

## 2024-02-21 PROCEDURE — 3078F DIAST BP <80 MM HG: CPT | Performed by: INTERNAL MEDICINE

## 2024-02-21 NOTE — PROGRESS NOTES
"    Subjective:     Encounter Date:02/21/2024      Patient ID: Jose Daniel Kerns is a 73 y.o. male.    Chief Complaint:  Coronary Artery Disease  Pertinent negatives include no chest pain, dizziness, leg swelling, palpitations or shortness of breath.   73-year-old white male with history of coronary disease hypertension hyperlipidemia sleep apnea presents to my office for a follow-up.  Patient is currently stable without any symptoms of chest pain or shortness of breath at rest or exertion.  No complaint of any PND or orthopnea.  No palpitations dizziness syncope or swelling of the feet.  Patient is taking all the medicines regularly.  Patient does not smoke.    The following portions of the patient's history were reviewed and updated as appropriate: allergies, current medications, past family history, past medical history, past social history, past surgical history, and problem list.  Past Medical History:   Diagnosis Date    Coronary artery disease     Coronary artery disease involving native heart without angina pectoris 4/15/2020    Essential hypertension 4/15/2020    Hyperlipidemia     Hyperlipidemia LDL goal <100 4/15/2020    Hypertension     Myocardial infarction      Past Surgical History:   Procedure Laterality Date    CARDIAC CATHETERIZATION      CORONARY ARTERY BYPASS GRAFT      4 vessel, 2005    SINUS SURGERY       /76   Pulse 54   Ht 167.6 cm (65.98\")   Wt 89.8 kg (198 lb)   SpO2 99%   BMI 31.97 kg/m²   Family History   Problem Relation Age of Onset    Heart disease Father        Current Outpatient Medications:     amLODIPine (NORVASC) 10 MG tablet, Take 1 tablet by mouth., Disp: , Rfl:     aspirin 81 MG tablet, Take 1 tablet by mouth., Disp: , Rfl:     atorvastatin (LIPITOR) 20 MG tablet, Take 1 tablet by mouth Daily., Disp: , Rfl:     metoprolol succinate XL (TOPROL-XL) 100 MG 24 hr tablet, Take 1 tablet by mouth Daily., Disp: , Rfl:     Omega-3 Fatty Acids (FISH OIL) 1200 MG capsule capsule, " 2 capsules Every 12 (Twelve) Hours., Disp: , Rfl:     TEKTURNA -12.5 MG tablet, Take 1 tablet by mouth Daily., Disp: , Rfl:     ZINC OXIDE PO, Take  by mouth., Disp: , Rfl:   No Known Allergies  Social History     Socioeconomic History    Marital status:    Tobacco Use    Smoking status: Never     Passive exposure: Yes    Smokeless tobacco: Never   Vaping Use    Vaping Use: Never used   Substance and Sexual Activity    Alcohol use: Not Currently    Drug use: Never    Sexual activity: Defer     Review of Systems   Constitutional: Negative for malaise/fatigue.   Cardiovascular:  Negative for chest pain, dyspnea on exertion, leg swelling and palpitations.   Respiratory:  Negative for cough and shortness of breath.    Gastrointestinal:  Negative for abdominal pain, nausea and vomiting.   Neurological:  Negative for dizziness, focal weakness, headaches, light-headedness and numbness.   All other systems reviewed and are negative.             Objective:     Constitutional:       Appearance: Well-developed.   Eyes:      General: No scleral icterus.     Conjunctiva/sclera: Conjunctivae normal.   HENT:      Head: Normocephalic and atraumatic.   Neck:      Vascular: No carotid bruit or JVD.   Pulmonary:      Effort: Pulmonary effort is normal.      Breath sounds: Normal breath sounds. No wheezing. No rales.   Cardiovascular:      Normal rate. Regular rhythm.   Pulses:     Intact distal pulses.   Abdominal:      General: Bowel sounds are normal.      Palpations: Abdomen is soft.   Musculoskeletal:      Cervical back: Normal range of motion and neck supple. Skin:     General: Skin is warm and dry.      Findings: No rash.   Neurological:      Mental Status: Alert.       Procedures    Lab Review:         MDM    #1 coronary disease  Patient has coronary bypass surgery x 4 vessels with a LIMA to LAD and saphenous graft to the diagonal branch marginal branch and RCA and is currently stable on medications with normal  function  2.  Hypertension  Patient's blood pressure is currently stable on medical therapy including amlodipine and metoprolol  3.  Hyperlipidemia  Patient is currently on atorvastatin and fish oil capsules  4.  Sleep apnea  Patient is sleep apnea and uses CPAP machine.    Patient's previous medical records, labs, and EKG were reviewed and discussed with the patient at today's visit.

## 2024-04-01 ENCOUNTER — PATIENT ROUNDING (BHMG ONLY) (OUTPATIENT)
Dept: ORTHOPEDIC SURGERY | Facility: CLINIC | Age: 74
End: 2024-04-01
Payer: MEDICARE

## 2024-04-01 ENCOUNTER — OFFICE VISIT (OUTPATIENT)
Dept: ORTHOPEDIC SURGERY | Facility: CLINIC | Age: 74
End: 2024-04-01
Payer: MEDICARE

## 2024-04-01 VITALS — OXYGEN SATURATION: 97 % | WEIGHT: 196 LBS | BODY MASS INDEX: 31.5 KG/M2 | HEART RATE: 55 BPM | HEIGHT: 66 IN

## 2024-04-01 DIAGNOSIS — M17.11 PRIMARY OSTEOARTHRITIS OF RIGHT KNEE: Primary | ICD-10-CM

## 2024-04-01 DIAGNOSIS — G89.29 CHRONIC PAIN OF RIGHT KNEE: ICD-10-CM

## 2024-04-01 DIAGNOSIS — M25.561 CHRONIC PAIN OF RIGHT KNEE: ICD-10-CM

## 2024-04-01 PROCEDURE — 99204 OFFICE O/P NEW MOD 45 MIN: CPT | Performed by: STUDENT IN AN ORGANIZED HEALTH CARE EDUCATION/TRAINING PROGRAM

## 2024-04-01 RX ORDER — LOSARTAN POTASSIUM AND HYDROCHLOROTHIAZIDE 12.5; 1 MG/1; MG/1
1 TABLET ORAL DAILY
COMMUNITY
Start: 2024-03-27

## 2024-04-01 NOTE — PROGRESS NOTES
NEW VISIT    Patient: Jose Daniel Kerns  ?  YOB: 1950    MRN: [id number]  ?  Chief Complaint   Patient presents with    Right Knee - Initial Evaluation      ?  HPI: Patient is a 73-year-old male presents today for acute on chronic right knee pain.  He has had off and on knee pain over the last few years, that acutely worsened over the last 2 weeks after he had a fall while pivoting onto a flexed knee on concrete hitting his kneecap.  At that time has had worsening anterior knee pain as well as intermittent swelling.  He has painful popping/catching across the front of the knee.  States symptoms are worse in the morning, or if he is seated for prolonged time as he is getting up and moving.  Denies locking or catching.  Has been utilizing hinged knee brace from urgent care with improvement in symptoms.  Also utilizes ice/heat and as needed Tylenol.  States overall symptoms have improved since initial injury.      Allergies: No Known Allergies    Past Medical History:   Diagnosis Date    Coronary artery disease     Coronary artery disease involving native heart without angina pectoris 4/15/2020    Essential hypertension 4/15/2020    Hyperlipidemia     Hyperlipidemia LDL goal <100 4/15/2020    Hypertension     Myocardial infarction      Past Surgical History:   Procedure Laterality Date    CARDIAC CATHETERIZATION      CORONARY ARTERY BYPASS GRAFT      4 vessel, 2005    SINUS SURGERY       Social History     Occupational History    Not on file   Tobacco Use    Smoking status: Never     Passive exposure: Yes    Smokeless tobacco: Never   Vaping Use    Vaping status: Never Used   Substance and Sexual Activity    Alcohol use: Not Currently    Drug use: Never    Sexual activity: Defer      Social History     Social History Narrative    Not on file     Family History   Problem Relation Age of Onset    Heart disease Father        Review of Systems  Constitutional: Negative.  Negative for fever.   Musculoskeletal:  "Positive for joint pain  Skin: Negative.  Negative for rash and wound.    Neurological: Negative for numbness.     Vitals:    04/01/24 0903   Pulse: 55   SpO2: 97%   Weight: 88.9 kg (196 lb)   Height: 167.6 cm (66\")        Physical Exam  Constitutional: Patient is oriented to person, place, and time. Appears well-developed and well-nourished.   Head: Normocephalic and atraumatic.   Pulmonary/Chest: Effort normal.   Musculoskeletal:   See detailed exam below   Neurological: Alert and oriented to person, place, and time. No sensory deficit. Coordination normal.   Skin: Skin is warm and dry. Capillary refill takes less than 2 seconds. No rash noted. No erythema.     The right knee is without obvious signs of acute bony deformity, quadriceps atrophy, swelling, erythema, or ecchymosis. Mild joint effusion. The patella is without tenderness. Apprehension is negative with medial and lateral glide. Patella crepitus is positive. Patella grind is positive. The medial joint line is tender to palpation. The lateral joint line is nontender to palpation.  There is mild tenderness over Joselyn's tubercle, and IT band from lateral knee to trochanteric region.  Positive tenderness over trochanteric bursa.  Other soft tissue including the distal hamstring tendons, pes anserine, quad tendon, patellar tendon, proximal gastroc tendon. Flexion & extension are limited at end range motion and painful. Knee strength is 4/5 secondary to pain. Varus & valgus stress, anterior drawer, Enma's, and posterior drawer are all negative. The opposite knee is nontender and stable. Gait is uncomfortable and tandem.      Diagnostics:  XR - 1 Result   I have personally reviewed Results for orders placed during the hospital encounter of 03/28/24    XR KNEE 3 VW RIGHT 3/28/2024   and my interpretation of the image is as follows: There is severe patellofemoral arthritis, and moderate medial compartment arthritis with joint space narrowing, subchondral " sclerosis and osteophyte formation.  No acute osseous abnormalities.       Assessment:  Diagnoses and all orders for this visit:    1. Primary osteoarthritis of right knee (Primary)    2. Chronic pain of right knee        Plan    Above diagnosis and plan discussed with the patient in office today.  I did personally review and discuss prior x-ray imaging remarkable for severe patellofemoral arthritis as well as moderate medial compartment arthritis.  His most symptomatic over patellofemoral joint in office today.  He has already had improvement with conservative treatment including knee bracing with activity, ice/heat, activity modification and as needed oral Tylenol.  The patient remains active with low impact cardiovascular activities and light weight training.  States overall symptoms are tolerable today and not impacting daily activities significantly.  We discussed additional conservative treatment options, and home exercise program was given via [employer].  We also briefly discussed formal physical therapy, and injection options including corticosteroid and viscosupplementation.   Rest, ice, compression, and elevation (RICE) therapy  Stretching and strengthening exercises  Discussed Tylenol 1 g 3 times daily as needed.  Oral NSAIDs through PCP and cardiologist given prior cardiac history.  Follow up as needed if new or worsening symptoms      Date of encounter: 04/01/2024   Gary Han DO    Electronically signed by Gary Han DO, 04/01/24, 9:16 AM EDT.    Disclaimer: Please note that areas of this note were completed with computer voice recognition software.  Quite often unanticipated grammatical, syntax, homophones, and other interpretive errors are inadvertently transcribed by the computer software. Please excuse any errors that have escaped final proofreading.

## 2024-07-01 ENCOUNTER — OFFICE VISIT (OUTPATIENT)
Dept: ORTHOPEDIC SURGERY | Facility: CLINIC | Age: 74
End: 2024-07-01
Payer: MEDICARE

## 2024-07-01 VITALS — WEIGHT: 196 LBS | BODY MASS INDEX: 31.5 KG/M2 | HEART RATE: 62 BPM | HEIGHT: 66 IN | OXYGEN SATURATION: 96 %

## 2024-07-01 DIAGNOSIS — G89.29 CHRONIC PAIN OF RIGHT KNEE: ICD-10-CM

## 2024-07-01 DIAGNOSIS — M17.11 PRIMARY OSTEOARTHRITIS OF RIGHT KNEE: Primary | ICD-10-CM

## 2024-07-01 DIAGNOSIS — M25.561 CHRONIC PAIN OF RIGHT KNEE: ICD-10-CM

## 2024-07-01 PROCEDURE — 99213 OFFICE O/P EST LOW 20 MIN: CPT | Performed by: STUDENT IN AN ORGANIZED HEALTH CARE EDUCATION/TRAINING PROGRAM

## 2024-07-01 PROCEDURE — 20610 DRAIN/INJ JOINT/BURSA W/O US: CPT | Performed by: STUDENT IN AN ORGANIZED HEALTH CARE EDUCATION/TRAINING PROGRAM

## 2024-07-01 RX ORDER — LIDOCAINE HYDROCHLORIDE 10 MG/ML
4 INJECTION, SOLUTION EPIDURAL; INFILTRATION; INTRACAUDAL; PERINEURAL
Status: COMPLETED | OUTPATIENT
Start: 2024-07-01 | End: 2024-07-01

## 2024-07-01 RX ORDER — TRIAMCINOLONE ACETONIDE 40 MG/ML
40 INJECTION, SUSPENSION INTRA-ARTICULAR; INTRAMUSCULAR
Status: COMPLETED | OUTPATIENT
Start: 2024-07-01 | End: 2024-07-01

## 2024-07-01 RX ADMIN — TRIAMCINOLONE ACETONIDE 40 MG: 40 INJECTION, SUSPENSION INTRA-ARTICULAR; INTRAMUSCULAR at 10:21

## 2024-07-01 RX ADMIN — LIDOCAINE HYDROCHLORIDE 4 ML: 10 INJECTION, SOLUTION EPIDURAL; INFILTRATION; INTRACAUDAL; PERINEURAL at 10:21

## 2024-07-01 NOTE — PROGRESS NOTES
FOLLOW UP VISIT    Patient: Jose Daniel Kerns  ?  YOB: 1950    MRN: [id number]  ?  Chief Complaint   Patient presents with    Right Knee - Follow-up, Pain      ?  Pain      Patient returning for follow-up of chronic right knee pain and osteoarthritis.  He has been attempting conservative management, with hinged knee brace, heat and activity modification since his last office visit in April.  He continues to have symptomatic pain of the right knee most consistent over the medial and patellofemoral compartment, significantly worsening with repetitive seated activities, flexion extension, or stairs.  Has also had additional swelling, and radiation of symptoms to lateral right hip and mid calf.  At this time states he is having 60% bad days, with limitations in both his desired activity level and ADLs.  The patient is very active with care of his home, lawn, as well as with walking and his family.  He denies acute injury.    Allergies: No Known Allergies    Past Medical History:   Diagnosis Date    Coronary artery disease     Coronary artery disease involving native heart without angina pectoris 4/15/2020    Essential hypertension 4/15/2020    Hyperlipidemia     Hyperlipidemia LDL goal <100 4/15/2020    Hypertension     Myocardial infarction      Past Surgical History:   Procedure Laterality Date    CARDIAC CATHETERIZATION      CORONARY ARTERY BYPASS GRAFT      4 vessel, 2005    SINUS SURGERY       Social History     Occupational History    Not on file   Tobacco Use    Smoking status: Never     Passive exposure: Yes    Smokeless tobacco: Never   Vaping Use    Vaping status: Never Used   Substance and Sexual Activity    Alcohol use: Not Currently    Drug use: Never    Sexual activity: Defer      Social History     Social History Narrative    Not on file     Family History   Problem Relation Age of Onset    Heart disease Father        Review of Systems  Constitutional: Negative.  Negative for fever.  "  Musculoskeletal: Positive for joint pain  Skin: Negative.  Negative for rash and wound.    Neurological: Negative for numbness.     Vitals:    07/01/24 0945   Pulse: 62   SpO2: 96%   Weight: 88.9 kg (196 lb)   Height: 167.6 cm (66\")        Physical Exam  Constitutional: Patient is oriented to person, place, and time. Appears well-developed and well-nourished.   Head: Normocephalic and atraumatic.   Pulmonary/Chest: Effort normal.   Musculoskeletal:   See detailed exam below   Neurological: Alert and oriented to person, place, and time. No sensory deficit. Coordination normal.   Skin: Skin is warm and dry. Capillary refill takes less than 2 seconds. No rash noted. No erythema.     The right knee is without obvious signs of acute bony deformity, quadriceps atrophy, swelling, erythema, or ecchymosis.  Moderate joint effusion.  There is also noted prepatellar bursa swelling.  The patella is with tenderness. Apprehension is negative with medial and lateral glide. Patella crepitus is positive. Patella grind is positive. The medial joint line is tender to palpation. The lateral joint line is nontender to palpation.  Continued mild tenderness of IT band from lateral hip to lateral knee.  Positive tenderness over trochanteric bursa.  Other soft tissue including the distal hamstring tendons, pes anserine, quad tendon, patellar tendon, proximal gastroc tendon. Flexion & extension are limited at end range motion and painful. Knee strength is 4/5 secondary to pain. Varus & valgus stress, anterior drawer, Enma's, and posterior drawer are all negative. The opposite knee is nontender and stable. Gait is uncomfortable and tandem.      Diagnostics:    No new imaging today    XR - 1 Result   I have personally reviewed Results for orders placed during the hospital encounter of 03/28/24    XR KNEE 3 VW RIGHT 3/28/2024   and my interpretation of the image is as follows: There is severe patellofemoral arthritis, and moderate medial " compartment arthritis with joint space narrowing, subchondral sclerosis and osteophyte formation.  No acute osseous abnormalities.       Assessment:  Diagnoses and all orders for this visit:    1. Primary osteoarthritis of right knee (Primary)  -     - Large Joint Arthrocentesis: R knee    2. Chronic pain of right knee  -     - Large Joint Arthrocentesis: R knee          Plan    Patient returning for follow-up of right knee osteoarthritis.  He has been attempting conservative management including activity modification, hinged knee brace, oral Tylenol and heat over the last 3 months, with minimal improvement.  Continues to have significantly limiting right knee symptoms regarding his arthritis including swelling, weight dependent pain and patellofemoral pain.  We again discussed surgical and conservative options.  The patient is not interested in surgical management at this time.  Additional conservative management discussed, specifically injection options including intra-articular corticosteroid and viscosupplementation.  After discussion of risks and benefit patient elected to proceed with intra-articular corticosteroid injection which was given as noted below without complication.  Encouraged him to continue other conservative management as previously discussed.  Will plan 3 to 4-month follow-up to monitor progress with injection  Rest, ice, compression, and elevation (RICE) therapy  Stretching and strengthening exercises  Discussed Tylenol 1 g 3 times daily as needed.    Follow-up in 3 to 4 months to monitor progress with intra-articular injection and conservative management.    - Large Joint Arthrocentesis: R knee on 7/1/2024 10:21 AM  Indications: pain  Details: 25 G needle, anterolateral approach  Medications: 40 mg triamcinolone acetonide 40 MG/ML; 4 mL lidocaine PF 1% 1 %  Outcome: tolerated well, no immediate complications  Procedure, treatment alternatives, risks and benefits explained, specific risks  discussed. Consent was given by the patient. Immediately prior to procedure a time out was called to verify the correct patient, procedure, equipment, support staff and site/side marked as required. Patient was prepped and draped in the usual sterile fashion.         Date of encounter: 7/1/2024  Gary Han DO      Disclaimer: Please note that areas of this note were completed with computer voice recognition software.  Quite often unanticipated grammatical, syntax, homophones, and other interpretive errors are inadvertently transcribed by the computer software. Please excuse any errors that have escaped final proofreading.

## 2024-09-11 ENCOUNTER — TELEPHONE (OUTPATIENT)
Dept: CARDIOLOGY | Facility: CLINIC | Age: 74
End: 2024-09-11
Payer: MEDICARE

## 2024-09-11 NOTE — TELEPHONE ENCOUNTER
Called pt. and told him that I did not order CPAP machines and he has to call his primary care doctor.

## 2024-09-11 NOTE — TELEPHONE ENCOUNTER
Caller: Jose Daniel Kerns    Relationship: Self    Best call back number: [phone]    What orders are you requesting (i.e. lab or imaging): CPAP MACHINE    Additional notes: PT SAID DR. BOLAND ORDERED CPAP MACHINE FOR HIM AND IT STOPPED WORKING LAST NIGHT. HE SAID HE'S HAD IT FOR 40 YEARS AND DR. BOLAND ORDERED IT. PT SAID [employer] (OR [employer]) THEIR FAX [phone], HE ASKED IF WE COULD FAX INFO SO THEY CAN SEND A NEW ONE. PT ASKED FOR A CALL BACK AND SAID IF WE ARE UNABLE TO REACH HIM WE CAN LEAVE A VM

## 2024-09-24 ENCOUNTER — OFFICE VISIT (OUTPATIENT)
Dept: CARDIOLOGY | Facility: CLINIC | Age: 74
End: 2024-09-24
Payer: MEDICARE

## 2024-09-24 VITALS
OXYGEN SATURATION: 98 % | SYSTOLIC BLOOD PRESSURE: 127 MMHG | WEIGHT: 193 LBS | BODY MASS INDEX: 31.02 KG/M2 | DIASTOLIC BLOOD PRESSURE: 71 MMHG | HEIGHT: 66 IN | HEART RATE: 48 BPM

## 2024-09-24 DIAGNOSIS — G47.33 OBSTRUCTIVE SLEEP APNEA: ICD-10-CM

## 2024-09-24 DIAGNOSIS — I25.10 CORONARY ARTERY DISEASE INVOLVING NATIVE CORONARY ARTERY OF NATIVE HEART WITHOUT ANGINA PECTORIS: Primary | ICD-10-CM

## 2024-09-24 DIAGNOSIS — E78.00 PURE HYPERCHOLESTEROLEMIA: ICD-10-CM

## 2024-09-24 DIAGNOSIS — I10 ESSENTIAL HYPERTENSION: ICD-10-CM

## 2024-09-24 PROCEDURE — 3074F SYST BP LT 130 MM HG: CPT | Performed by: INTERNAL MEDICINE

## 2024-09-24 PROCEDURE — 1160F RVW MEDS BY RX/DR IN RCRD: CPT | Performed by: INTERNAL MEDICINE

## 2024-09-24 PROCEDURE — 99214 OFFICE O/P EST MOD 30 MIN: CPT | Performed by: INTERNAL MEDICINE

## 2024-09-24 PROCEDURE — 3078F DIAST BP <80 MM HG: CPT | Performed by: INTERNAL MEDICINE

## 2024-09-24 PROCEDURE — 1159F MED LIST DOCD IN RCRD: CPT | Performed by: INTERNAL MEDICINE

## 2024-10-02 ENCOUNTER — OFFICE VISIT (OUTPATIENT)
Dept: ORTHOPEDIC SURGERY | Facility: CLINIC | Age: 74
End: 2024-10-02
Payer: MEDICARE

## 2024-10-02 VITALS — BODY MASS INDEX: 31.02 KG/M2 | WEIGHT: 193 LBS | HEIGHT: 66 IN | HEART RATE: 55 BPM | OXYGEN SATURATION: 97 %

## 2024-10-02 DIAGNOSIS — G89.29 CHRONIC PAIN OF RIGHT KNEE: ICD-10-CM

## 2024-10-02 DIAGNOSIS — M25.561 CHRONIC PAIN OF RIGHT KNEE: ICD-10-CM

## 2024-10-02 DIAGNOSIS — M17.11 PRIMARY OSTEOARTHRITIS OF RIGHT KNEE: Primary | ICD-10-CM

## 2024-10-02 NOTE — PROGRESS NOTES
FOLLOW UP VISIT    Patient: Jose Daniel Kerns  ?  YOB: 1950    MRN: [id number]  ?  Chief Complaint   Patient presents with    Right Knee - Follow-up      ?  Pain    Follow-up    Patient returning for follow-up of right knee pain and osteoarthritis.  Had most recent intra-articular corticosteroid injection on 7/1/2024 which he states gave him partial proximally 50% relief which is still ongoing at this time.  Specifically has helped even ground walking and activity.  He continues to have pain around the kneecap area, with repetitive flexion extension, specifically stairs, uneven ground or walking up incline.  Denies new injury.  Denies numbness or tingling.  Has occasional painful popping.  Mild associated swelling.  Denies locking or catching.  Overall has had improved ADLs with most recent treatment despite persistent intermittent symptoms.  Continues to utilize RICE treatment and hinged knee brace as needed with activity.  Remains uninterested in surgical options at this time.    Allergies: No Known Allergies    Past Medical History:   Diagnosis Date    Coronary artery disease     Coronary artery disease involving native heart without angina pectoris 4/15/2020    Essential hypertension 4/15/2020    Hyperlipidemia     Hyperlipidemia LDL goal <100 4/15/2020    Hypertension     Myocardial infarction      Past Surgical History:   Procedure Laterality Date    CARDIAC CATHETERIZATION      CORONARY ARTERY BYPASS GRAFT      4 vessel, 2005    SINUS SURGERY       Social History     Occupational History    Not on file   Tobacco Use    Smoking status: Never     Passive exposure: Yes    Smokeless tobacco: Never   Vaping Use    Vaping status: Never Used   Substance and Sexual Activity    Alcohol use: Not Currently    Drug use: Never    Sexual activity: Defer      Social History     Social History Narrative    Not on file     Family History   Problem Relation Age of Onset    Heart disease Father        Review of  "Systems  Constitutional: Negative.  Negative for fever.   Musculoskeletal: Positive for joint pain  Skin: Negative.  Negative for rash and wound.    Neurological: Negative for numbness.     Vitals:    10/02/24 0857   Pulse: 55   SpO2: 97%   Weight: 87.5 kg (193 lb)   Height: 167.6 cm (66\")        Physical Exam  Constitutional: Patient is oriented to person, place, and time. Appears well-developed and well-nourished.   Head: Normocephalic and atraumatic.   Pulmonary/Chest: Effort normal.   Musculoskeletal:   See detailed exam below   Neurological: Alert and oriented to person, place, and time. No sensory deficit. Coordination normal.   Skin: Skin is warm and dry. Capillary refill takes less than 2 seconds. No rash noted. No erythema.     The right knee is without obvious signs of acute bony deformity, quadriceps atrophy, swelling, erythema, or ecchymosis.  Trace joint effusion.  The patella is with tenderness. Apprehension is negative with medial and lateral glide. Patella crepitus is positive. Patella grind is positive. The medial joint line is tender to palpation. The lateral joint line is nontender to palpation.  Trochanteric and IT pain has resolved.  Other soft tissue including the distal hamstring tendons, pes anserine, quad tendon, patellar tendon, proximal gastroc tendon. Flexion & extension are limited at end range motion and painful. Knee strength is 4/5 secondary to pain. Varus & valgus stress, anterior drawer, Enma's, and posterior drawer are all negative. The opposite knee is nontender and stable. Gait is uncomfortable and tandem.      Diagnostics:    No new imaging today    XR - 1 Result   I have personally reviewed Results for orders placed during the hospital encounter of 03/28/24    XR KNEE 3 VW RIGHT 3/28/2024   and my interpretation of the image is as follows: There is severe patellofemoral arthritis, and moderate medial compartment arthritis with joint space narrowing, subchondral sclerosis " and osteophyte formation.  No acute osseous abnormalities.       Assessment:  Diagnoses and all orders for this visit:    1. Primary osteoarthritis of right knee (Primary)    2. Chronic pain of right knee            Plan    Patient returning for follow-up of right knee osteoarthritis, and most recent intra-articular corticosteroid follow-up.  He has had substantial improvement with his most recent injection over 50 to 60% pain relief, and improved function, specifically with even ground activities.  Continues to have pain primarily around patellofemoral joint with repetitive activity including stairs, squatting and incline.  Despite persistent intermittent symptoms, he has continued to be able to do these activities.  We again discussed surgical and continued conservative treatment.  He would like to continue conservative treatment, given continues to have ongoing benefit from his steroid injection we will hold on further injections at this time.  Could revisit repeat corticosteroid injection or transition to viscosupplementation based on continue benefit.  Encouraged him to continue other conservative management as previously discussed and noted below including RICE treatment, hinged knee brace, and over-the-counter Tylenol as needed.  He remains uninterested in surgical options at this time, will revisit at follow-up visits as needed.  Rest, ice, compression, and elevation (RICE) therapy  Stretching and strengthening exercises  Discussed Tylenol 1 g 3 times daily as needed.    Will follow-up as needed for new or worsening symptoms.      Date of encounter: 10/2/2024  Gary Han DO      Disclaimer: Please note that areas of this note were completed with computer voice recognition software.  Quite often unanticipated grammatical, syntax, homophones, and other interpretive errors are inadvertently transcribed by the computer software. Please excuse any errors that have escaped final proofreading.

## 2025-02-06 ENCOUNTER — TELEPHONE (OUTPATIENT)
Dept: CARDIOLOGY | Facility: CLINIC | Age: 75
End: 2025-02-06
Payer: MEDICARE

## 2025-02-06 NOTE — TELEPHONE ENCOUNTER
Caller: Jose Daniel Kerns    Relationship: Self    Best call back number:  [phone]      PATIENT WOULD LIKE A CALL BACK TO DISCUSS HIS BP      PATIENTS HEART RATE WAS RUNNING 45-50 SO  PATIENTS PCP CHANGED HIS METOPROLOL TO 50MG INSTEAD  AND PRESCRIBED HIM HYDRALAZINE 25MG TWICE A DAY    NOW THE PATIENTS BP IS RUNNING 157-160/85-90 FOR THE LAST FEW DAYS, HR 57-58

## 2025-02-12 ENCOUNTER — TELEPHONE (OUTPATIENT)
Dept: CARDIOLOGY | Facility: CLINIC | Age: 75
End: 2025-02-12
Payer: MEDICARE

## 2025-02-12 RX ORDER — HYDRALAZINE HYDROCHLORIDE 50 MG/1
50 TABLET, FILM COATED ORAL 2 TIMES DAILY
COMMUNITY
End: 2025-02-17 | Stop reason: SDUPTHER

## 2025-02-12 NOTE — TELEPHONE ENCOUNTER
Blood pressures reviewed.  They are still on the higher side as goal BP is less than 140/90 .   Please have patient increase hydralazine to 50 mg BID  Please also update med list and add hydralazine since his PCP started him on medication

## 2025-02-12 NOTE — TELEPHONE ENCOUNTER
Caller: Jose Daniel Kerns    Relationship to patient: Self    Best call back number: [phone]     Patient is needing: BP READINGS-    2.7.25- AM, 167/96, HR 59. PM, 158/81, HR57.    2.8.25- AM, 160/98, HR 54. PM, 147/81, HR57.    2.9.25- AM, 165/82 HR56, PM, 130/80 HR59.     2.10.25- AM, 159/87 HR56, PM, 137/72 HR57.    2.11.25- AM, 150/91 HR52, PM, 154/87 HR52.    2.12.25- AM, 171/95 HR53

## 2025-02-12 NOTE — TELEPHONE ENCOUNTER
Called patient and verbally explained we are going to increase his hydralazine to 50 mg BID and then have him monitor his BP and HR for 7 days after starting increased dosage of hydralazine.    Patient verbally understood and had no further questions or concerns.    UPDATED MEDICATION LIST.

## 2025-02-17 ENCOUNTER — TELEPHONE (OUTPATIENT)
Dept: CARDIOLOGY | Facility: CLINIC | Age: 75
End: 2025-02-17
Payer: MEDICARE

## 2025-02-17 RX ORDER — HYDRALAZINE HYDROCHLORIDE 50 MG/1
50 TABLET, FILM COATED ORAL 2 TIMES DAILY
Qty: 180 TABLET | Refills: 3 | Status: SHIPPED | OUTPATIENT
Start: 2025-02-17

## 2025-02-17 RX ORDER — HYDRALAZINE HYDROCHLORIDE 50 MG/1
50 TABLET, FILM COATED ORAL 2 TIMES DAILY
Qty: 60 TABLET | Refills: 0 | Status: SHIPPED | OUTPATIENT
Start: 2025-02-17 | End: 2025-02-17 | Stop reason: SDUPTHER

## 2025-02-17 NOTE — TELEPHONE ENCOUNTER
Office Visit with Silverio Hall MD (09/24/2024)     Telephone with Silverio Hall MD (02/12/2025)     MEDICATION LIST IS UP TO DATE ON WHAT PT IS CURRENTLY TAKING.

## 2025-02-17 NOTE — TELEPHONE ENCOUNTER
Blood pressures reviewed.  Continue current medical therapy    Please make sure patient is taking amlodipine 10 mg, hydralazine 50 mg BID and metoprolol succinate 100 mg daily  If he needs a new prescription for hydralazine please send in as his phone call states he is almost out of hydralazine 25 mg    If sending in refill please send in 50 mg

## 2025-02-17 NOTE — TELEPHONE ENCOUNTER
Called patient and verbally expressed there will be no further medication adjustments and we will go ahead and send in the new script for the hydralazine.    Patient understood and had no further questions or concerns at this time

## 2025-02-17 NOTE — TELEPHONE ENCOUNTER
Pt asked to keep BP:     02/12/25  144/77 52  02/13/25   141/84 54  159/89  56  02/14/25   136/78 53   155/87  58     02/15        147/85 59   149/76  66  02/16        154/83 58   132/75  59  02/17        148/87 59    Pt is asking for a call back is almost out of his alazine 25mg.

## 2025-02-24 ENCOUNTER — TELEPHONE (OUTPATIENT)
Dept: CARDIOLOGY | Facility: CLINIC | Age: 75
End: 2025-02-24
Payer: MEDICARE

## 2025-02-24 RX ORDER — METOPROLOL SUCCINATE 50 MG/1
50 TABLET, EXTENDED RELEASE ORAL DAILY
Qty: 90 TABLET | Refills: 1 | Status: SHIPPED | OUTPATIENT
Start: 2025-02-24

## 2025-02-24 NOTE — TELEPHONE ENCOUNTER
Incoming Refill Request      Medication requested (name and dose): METOPROLOL 50 MG    Pharmacy where request should be sent: [facility]    Additional details provided by patient: WAS CHANGED FROM 100 MG TO 50 MG     Best call back number: [phone]    Does the patient have less than a 3 day supply:  [] Yes  [x] No    Veronica Lawson, RegSched Rep  02/24/25, 14:39 EST

## 2025-03-24 ENCOUNTER — HOSPITAL ENCOUNTER (OUTPATIENT)
Dept: CARDIOLOGY | Facility: HOSPITAL | Age: 75
Discharge: HOME | End: 2025-03-24
Payer: MEDICARE

## 2025-03-24 ENCOUNTER — OFFICE VISIT (OUTPATIENT)
Dept: CARDIOLOGY | Facility: CLINIC | Age: 75
End: 2025-03-24
Payer: MEDICARE

## 2025-03-24 VITALS
SYSTOLIC BLOOD PRESSURE: 124 MMHG | WEIGHT: 196 LBS | BODY MASS INDEX: 31.5 KG/M2 | HEART RATE: 60 BPM | HEIGHT: 66 IN | DIASTOLIC BLOOD PRESSURE: 77 MMHG | OXYGEN SATURATION: 96 %

## 2025-03-24 VITALS
WEIGHT: 193 LBS | BODY MASS INDEX: 31.02 KG/M2 | HEIGHT: 66 IN | DIASTOLIC BLOOD PRESSURE: 82 MMHG | SYSTOLIC BLOOD PRESSURE: 147 MMHG

## 2025-03-24 DIAGNOSIS — I10 ESSENTIAL HYPERTENSION: ICD-10-CM

## 2025-03-24 DIAGNOSIS — G47.33 OBSTRUCTIVE SLEEP APNEA: ICD-10-CM

## 2025-03-24 DIAGNOSIS — E78.00 PURE HYPERCHOLESTEROLEMIA: ICD-10-CM

## 2025-03-24 DIAGNOSIS — I25.10 CORONARY ARTERY DISEASE INVOLVING NATIVE CORONARY ARTERY OF NATIVE HEART WITHOUT ANGINA PECTORIS: ICD-10-CM

## 2025-03-24 DIAGNOSIS — I25.10 CORONARY ARTERY DISEASE INVOLVING NATIVE CORONARY ARTERY OF NATIVE HEART WITHOUT ANGINA PECTORIS: Primary | ICD-10-CM

## 2025-03-24 LAB
AORTIC DIMENSIONLESS INDEX: 0.77 (DI)
AV MEAN PRESS GRAD SYS DOP V1V2: 3.8 MMHG
AV VMAX SYS DOP: 147.4 CM/SEC
BH CV ECHO MEAS - AI P1/2T: 457.1 MSEC
BH CV ECHO MEAS - AO MAX PG: 8.7 MMHG
BH CV ECHO MEAS - AO ROOT DIAM: 3.5 CM
BH CV ECHO MEAS - AO V2 VTI: 30.8 CM
BH CV ECHO MEAS - AVA(I,D): 2.7 CM2
BH CV ECHO MEAS - EDV(CUBED): 145.8 ML
BH CV ECHO MEAS - EDV(MOD-SP2): 120.7 ML
BH CV ECHO MEAS - EDV(MOD-SP4): 120.9 ML
BH CV ECHO MEAS - EF(MOD-SP2): 65.4 %
BH CV ECHO MEAS - EF(MOD-SP4): 66.6 %
BH CV ECHO MEAS - ESV(CUBED): 61.5 ML
BH CV ECHO MEAS - ESV(MOD-SP2): 41.8 ML
BH CV ECHO MEAS - ESV(MOD-SP4): 40.4 ML
BH CV ECHO MEAS - FS: 25 %
BH CV ECHO MEAS - IVS/LVPW: 1.06 CM
BH CV ECHO MEAS - IVSD: 1.21 CM
BH CV ECHO MEAS - LA DIMENSION: 4.5 CM
BH CV ECHO MEAS - LAT PEAK E' VEL: 6.9 CM/SEC
BH CV ECHO MEAS - LV MASS(C)D: 247 GRAMS
BH CV ECHO MEAS - LV MAX PG: 5.7 MMHG
BH CV ECHO MEAS - LV MEAN PG: 2.6 MMHG
BH CV ECHO MEAS - LV V1 MAX: 119.3 CM/SEC
BH CV ECHO MEAS - LV V1 VTI: 23.7 CM
BH CV ECHO MEAS - LVIDD: 5.3 CM
BH CV ECHO MEAS - LVIDS: 3.9 CM
BH CV ECHO MEAS - LVOT AREA: 3.6 CM2
BH CV ECHO MEAS - LVOT DIAM: 2.13 CM
BH CV ECHO MEAS - LVPWD: 1.14 CM
BH CV ECHO MEAS - MED PEAK E' VEL: 5.4 CM/SEC
BH CV ECHO MEAS - MV A DUR: 0.15 SEC
BH CV ECHO MEAS - MV A MAX VEL: 101.5 CM/SEC
BH CV ECHO MEAS - MV DEC SLOPE: 350.2 CM/SEC2
BH CV ECHO MEAS - MV DEC TIME: 0.25 SEC
BH CV ECHO MEAS - MV E MAX VEL: 86.8 CM/SEC
BH CV ECHO MEAS - MV E/A: 0.86
BH CV ECHO MEAS - MV MAX PG: 5 MMHG
BH CV ECHO MEAS - MV MEAN PG: 1.17 MMHG
BH CV ECHO MEAS - MV V2 VTI: 33.7 CM
BH CV ECHO MEAS - MVA(VTI): 2.5 CM2
BH CV ECHO MEAS - PA ACC TIME: 0.13 SEC
BH CV ECHO MEAS - PA V2 MAX: 128.4 CM/SEC
BH CV ECHO MEAS - PULM A REVS DUR: 0.11 SEC
BH CV ECHO MEAS - PULM A REVS VEL: 43 CM/SEC
BH CV ECHO MEAS - PULM DIAS VEL: 29 CM/SEC
BH CV ECHO MEAS - PULM S/D: 1.43
BH CV ECHO MEAS - PULM SYS VEL: 41.4 CM/SEC
BH CV ECHO MEAS - RAP SYSTOLE: 15 MMHG
BH CV ECHO MEAS - RV MAX PG: 1.96 MMHG
BH CV ECHO MEAS - RV V1 MAX: 70 CM/SEC
BH CV ECHO MEAS - RV V1 VTI: 15.7 CM
BH CV ECHO MEAS - RVSP: 34.6 MMHG
BH CV ECHO MEAS - SV(LVOT): 84.3 ML
BH CV ECHO MEAS - SV(MOD-SP2): 79 ML
BH CV ECHO MEAS - SV(MOD-SP4): 80.5 ML
BH CV ECHO MEAS - TAPSE (>1.6): 1.56 CM
BH CV ECHO MEAS - TR MAX PG: 19.6 MMHG
BH CV ECHO MEAS - TR MAX VEL: 221.5 CM/SEC
BH CV ECHO MEASUREMENTS AVERAGE E/E' RATIO: 14.11
BH CV REST NUCLEAR ISOTOPE DOSE: 11.2 MCI
BH CV STRESS COMMENTS STAGE 1: NORMAL
BH CV STRESS DOSE REGADENOSON STAGE 1: 0.4
BH CV STRESS DURATION MIN STAGE 1: 0
BH CV STRESS DURATION SEC STAGE 1: 10
BH CV STRESS NUCLEAR ISOTOPE DOSE: 33.2 MCI
BH CV STRESS PROTOCOL 1: NORMAL
BH CV STRESS RECOVERY BP: NORMAL MMHG
BH CV STRESS RECOVERY HR: 74 BPM
BH CV STRESS STAGE 1: 1
MAXIMAL PREDICTED HEART RATE: 144 BPM
SPECT HRT GATED+EF W RNC IV: 57 %
STRESS BASELINE BP: NORMAL MMHG
STRESS BASELINE HR: 57 BPM
STRESS TARGET HR: 122 BPM

## 2025-03-24 PROCEDURE — G2211 COMPLEX E/M VISIT ADD ON: HCPCS | Performed by: INTERNAL MEDICINE

## 2025-03-24 PROCEDURE — 93018 CV STRESS TEST I&R ONLY: CPT | Performed by: INTERNAL MEDICINE

## 2025-03-24 PROCEDURE — A9500 TC99M SESTAMIBI: HCPCS | Performed by: INTERNAL MEDICINE

## 2025-03-24 PROCEDURE — 34310000005 TECHNETIUM SESTAMIBI: Performed by: INTERNAL MEDICINE

## 2025-03-24 PROCEDURE — 93017 CV STRESS TEST TRACING ONLY: CPT

## 2025-03-24 PROCEDURE — 1159F MED LIST DOCD IN RCRD: CPT | Performed by: INTERNAL MEDICINE

## 2025-03-24 PROCEDURE — 3074F SYST BP LT 130 MM HG: CPT | Performed by: INTERNAL MEDICINE

## 2025-03-24 PROCEDURE — 1160F RVW MEDS BY RX/DR IN RCRD: CPT | Performed by: INTERNAL MEDICINE

## 2025-03-24 PROCEDURE — 78452 HT MUSCLE IMAGE SPECT MULT: CPT | Performed by: INTERNAL MEDICINE

## 2025-03-24 PROCEDURE — 93306 TTE W/DOPPLER COMPLETE: CPT | Performed by: INTERNAL MEDICINE

## 2025-03-24 PROCEDURE — 78452 HT MUSCLE IMAGE SPECT MULT: CPT

## 2025-03-24 PROCEDURE — 3078F DIAST BP <80 MM HG: CPT | Performed by: INTERNAL MEDICINE

## 2025-03-24 PROCEDURE — 99214 OFFICE O/P EST MOD 30 MIN: CPT | Performed by: INTERNAL MEDICINE

## 2025-03-24 PROCEDURE — 93016 CV STRESS TEST SUPVJ ONLY: CPT | Performed by: NURSE PRACTITIONER

## 2025-03-24 PROCEDURE — 25010000002 REGADENOSON 0.4 MG/5ML SOLUTION: Performed by: INTERNAL MEDICINE

## 2025-03-24 PROCEDURE — 93306 TTE W/DOPPLER COMPLETE: CPT

## 2025-03-24 PROCEDURE — 25010000002 SULFUR HEXAFLUORIDE MICROSPH 60.7-25 MG RECONSTITUTED SUSPENSION: Performed by: INTERNAL MEDICINE

## 2025-03-24 RX ORDER — REGADENOSON 0.08 MG/ML
0.4 INJECTION, SOLUTION INTRAVENOUS
Status: COMPLETED | OUTPATIENT
Start: 2025-03-24 | End: 2025-03-24

## 2025-03-24 RX ADMIN — REGADENOSON 0.4 MG: 0.08 INJECTION, SOLUTION INTRAVENOUS at 13:42

## 2025-03-24 RX ADMIN — TECHNETIUM TC 99M SESTAMIBI 1 DOSE: 1 INJECTION INTRAVENOUS at 12:14

## 2025-03-24 RX ADMIN — SULFUR HEXAFLUORIDE 3 ML: KIT at 13:31

## 2025-03-24 RX ADMIN — TECHNETIUM TC 99M SESTAMIBI 1 DOSE: 1 INJECTION INTRAVENOUS at 13:42

## 2025-03-24 NOTE — PROGRESS NOTES
"    Subjective:     Encounter Date:03/24/2025      Patient ID: Jose Daniel Kerns is a 74 y.o. male.    Chief Complaint:  History of Present Illness 74-year-old white male with history of coronary disease status post coronary bypass surgery history of hypertension hyperlipidemia sleep apnea presents to office for a follow-up.  Patient is currently stable without any symptoms of chest pain or shortness of breath at rest or exertion.  No grandmas any PND orthopnea.  No palpitation dizziness syncope or swelling of the feet.  He is taking his medicines regularly does not smoke    The following portions of the patient's history were reviewed and updated as appropriate: allergies, current medications, past family history, past medical history, past social history, past surgical history, and problem list.  Past Medical History:   Diagnosis Date    Coronary artery disease     Coronary artery disease involving native heart without angina pectoris 4/15/2020    Essential hypertension 4/15/2020    Hyperlipidemia     Hyperlipidemia LDL goal <100 4/15/2020    Hypertension     Myocardial infarction      Past Surgical History:   Procedure Laterality Date    CARDIAC CATHETERIZATION      CORONARY ARTERY BYPASS GRAFT      4 vessel, 2005    SINUS SURGERY       /77   Pulse 60   Ht 167.6 cm (66\")   Wt 88.9 kg (196 lb)   SpO2 96%   BMI 31.64 kg/m²   Family History   Problem Relation Age of Onset    Heart disease Father        Current Outpatient Medications:     amLODIPine (NORVASC) 10 MG tablet, Take 1 tablet by mouth., Disp: , Rfl:     aspirin 81 MG tablet, Take 1 tablet by mouth., Disp: , Rfl:     atorvastatin (LIPITOR) 20 MG tablet, Take 1 tablet by mouth Daily., Disp: , Rfl:     Diclofenac Sodium (VOLTAREN) 1 % gel gel, Apply 4 g topically to the appropriate area as directed 4 (Four) Times a Day As Needed (pain and swelling)., Disp: 50 g, Rfl: 0    hydrALAZINE (APRESOLINE) 50 MG tablet, Take 1 tablet by mouth 2 (Two) Times a " Day., Disp: 180 tablet, Rfl: 3    losartan-hydrochlorothiazide (HYZAAR) 100-12.5 MG per tablet, Take 1 tablet by mouth Daily., Disp: , Rfl:     metoprolol succinate XL (TOPROL-XL) 50 MG 24 hr tablet, Take 1 tablet by mouth Daily., Disp: 90 tablet, Rfl: 1    Omega-3 Fatty Acids (FISH OIL) 1200 MG capsule capsule, 2 capsules Every 12 (Twelve) Hours., Disp: , Rfl:     TEKTURNA -12.5 MG tablet, Take 1 tablet by mouth Daily., Disp: , Rfl:     ZINC OXIDE PO, Take  by mouth., Disp: , Rfl:   No current facility-administered medications for this visit.  No Known Allergies  Social History     Socioeconomic History    Marital status:    Tobacco Use    Smoking status: Never     Passive exposure: Yes    Smokeless tobacco: Never   Vaping Use    Vaping status: Never Used   Substance and Sexual Activity    Alcohol use: Not Currently    Drug use: Never    Sexual activity: Defer     Review of Systems   Constitutional: Negative for malaise/fatigue.   Cardiovascular:  Negative for chest pain, dyspnea on exertion, leg swelling and palpitations.   Respiratory:  Negative for cough and shortness of breath.    Gastrointestinal:  Negative for abdominal pain, nausea and vomiting.   Neurological:  Negative for dizziness, focal weakness, headaches, light-headedness and numbness.   All other systems reviewed and are negative.             Objective:     Constitutional:       Appearance: Well-developed.   Eyes:      General: No scleral icterus.     Conjunctiva/sclera: Conjunctivae normal.   HENT:      Head: Normocephalic and atraumatic.   Neck:      Vascular: No carotid bruit or JVD.   Pulmonary:      Effort: Pulmonary effort is normal.      Breath sounds: Normal breath sounds. No wheezing. No rales.   Cardiovascular:      Normal rate. Regular rhythm.   Pulses:     Intact distal pulses.   Abdominal:      General: Bowel sounds are normal.      Palpations: Abdomen is soft.   Musculoskeletal:      Cervical back: Normal range of motion  and neck supple. Skin:     General: Skin is warm and dry.      Findings: No rash.   Neurological:      Mental Status: Alert.       Procedures    Lab Review:         MDM    #1 coronary disease  Patient had coronary bypass surgery x 4 vessels with a LIMA to LAD and SVG to the diagonal branch marginal branch and RCA and is currently stable on medical therapy  Patient underwent an echocardiogram which showed EF of 50 to 55% with apical wall hypokinesis  He also had a stress Myoview study which showed only apical infarct but no ischemia.  Will continue him on medical therapy.  2.  Hypertension  Patient blood pressure currently stable on amlodipine hydralazine losartan and metoprolol and Tekturna  3.  Hyperlipidemia  Patient on atorvastatin and the lipid levels are well within normal meds  4.  Sleep apnea  Patient has sleep apnea and uses a CPAP machine    Patient's previous medical records, labs, and EKG were reviewed and discussed with the patient at today's visit.

## 2025-06-06 PROCEDURE — 87086 URINE CULTURE/COLONY COUNT: CPT | Performed by: PHYSICIAN ASSISTANT

## 2025-06-17 ENCOUNTER — HOSPITAL ENCOUNTER (EMERGENCY)
Facility: HOSPITAL | Age: 75
Discharge: HOME | End: 2025-06-17
Payer: MEDICARE

## 2025-06-17 ENCOUNTER — APPOINTMENT (OUTPATIENT)
Dept: CT IMAGING | Facility: HOSPITAL | Age: 75
End: 2025-06-17
Payer: MEDICARE

## 2025-06-17 VITALS
DIASTOLIC BLOOD PRESSURE: 72 MMHG | WEIGHT: 187 LBS | OXYGEN SATURATION: 96 % | BODY MASS INDEX: 30.05 KG/M2 | HEART RATE: 63 BPM | HEIGHT: 66 IN | RESPIRATION RATE: 17 BRPM | SYSTOLIC BLOOD PRESSURE: 133 MMHG | TEMPERATURE: 98.5 F

## 2025-06-17 DIAGNOSIS — R10.30 LOWER ABDOMINAL PAIN: Primary | ICD-10-CM

## 2025-06-17 DIAGNOSIS — R11.2 NAUSEA VOMITING AND DIARRHEA: ICD-10-CM

## 2025-06-17 DIAGNOSIS — R19.7 NAUSEA VOMITING AND DIARRHEA: ICD-10-CM

## 2025-06-17 LAB
ALBUMIN SERPL-MCNC: 4.2 G/DL (ref 3.5–5.2)
ALBUMIN/GLOB SERPL: 1.4 G/DL
ALP SERPL-CCNC: 66 U/L (ref 39–117)
ALT SERPL W P-5'-P-CCNC: 38 U/L (ref 1–41)
ANION GAP SERPL CALCULATED.3IONS-SCNC: 10.9 MMOL/L (ref 5–15)
AST SERPL-CCNC: 34 U/L (ref 1–40)
BASOPHILS # BLD AUTO: 0.01 10*3/MM3 (ref 0–0.2)
BASOPHILS NFR BLD AUTO: 0.3 % (ref 0–1.5)
BILIRUB SERPL-MCNC: 1.1 MG/DL (ref 0–1.2)
BILIRUB UR QL STRIP: NEGATIVE
BUN SERPL-MCNC: 14.2 MG/DL (ref 8–23)
BUN/CREAT SERPL: 13.9 (ref 7–25)
CALCIUM SPEC-SCNC: 10.6 MG/DL (ref 8.6–10.5)
CHLORIDE SERPL-SCNC: 101 MMOL/L (ref 98–107)
CLARITY UR: CLEAR
CO2 SERPL-SCNC: 24.1 MMOL/L (ref 22–29)
COLOR UR: YELLOW
CREAT SERPL-MCNC: 1.02 MG/DL (ref 0.76–1.27)
DEPRECATED RDW RBC AUTO: 41 FL (ref 37–54)
EGFRCR SERPLBLD CKD-EPI 2021: 76.2 ML/MIN/1.73
EOSINOPHIL # BLD AUTO: 0 10*3/MM3 (ref 0–0.4)
EOSINOPHIL NFR BLD AUTO: 0 % (ref 0.3–6.2)
ERYTHROCYTE [DISTWIDTH] IN BLOOD BY AUTOMATED COUNT: 12.9 % (ref 12.3–15.4)
GLOBULIN UR ELPH-MCNC: 3.1 GM/DL
GLUCOSE SERPL-MCNC: 120 MG/DL (ref 65–99)
GLUCOSE UR STRIP-MCNC: NEGATIVE MG/DL
HCT VFR BLD AUTO: 45.3 % (ref 37.5–51)
HGB BLD-MCNC: 14.9 G/DL (ref 13–17.7)
HGB UR QL STRIP.AUTO: NEGATIVE
IMM GRANULOCYTES # BLD AUTO: 0.01 10*3/MM3 (ref 0–0.05)
IMM GRANULOCYTES NFR BLD AUTO: 0.3 % (ref 0–0.5)
KETONES UR QL STRIP: NEGATIVE
LEUKOCYTE ESTERASE UR QL STRIP.AUTO: NEGATIVE
LIPASE SERPL-CCNC: 18 U/L (ref 13–60)
LYMPHOCYTES # BLD AUTO: 0.41 10*3/MM3 (ref 0.7–3.1)
LYMPHOCYTES NFR BLD AUTO: 11.9 % (ref 19.6–45.3)
MCH RBC QN AUTO: 28.7 PG (ref 26.6–33)
MCHC RBC AUTO-ENTMCNC: 32.9 G/DL (ref 31.5–35.7)
MCV RBC AUTO: 87.1 FL (ref 79–97)
MONOCYTES # BLD AUTO: 0.5 10*3/MM3 (ref 0.1–0.9)
MONOCYTES NFR BLD AUTO: 14.5 % (ref 5–12)
NEUTROPHILS NFR BLD AUTO: 2.52 10*3/MM3 (ref 1.7–7)
NEUTROPHILS NFR BLD AUTO: 73 % (ref 42.7–76)
NITRITE UR QL STRIP: NEGATIVE
NRBC BLD AUTO-RTO: 0 /100 WBC (ref 0–0.2)
PH UR STRIP.AUTO: 7 [PH] (ref 5–8)
PLATELET # BLD AUTO: 166 10*3/MM3 (ref 140–450)
PMV BLD AUTO: 9.9 FL (ref 6–12)
POTASSIUM SERPL-SCNC: 3.7 MMOL/L (ref 3.5–5.2)
PROT SERPL-MCNC: 7.3 G/DL (ref 6–8.5)
PROT UR QL STRIP: NEGATIVE
RBC # BLD AUTO: 5.2 10*6/MM3 (ref 4.14–5.8)
SODIUM SERPL-SCNC: 136 MMOL/L (ref 136–145)
SP GR UR STRIP: 1.02 (ref 1–1.03)
UROBILINOGEN UR QL STRIP: NORMAL
WBC NRBC COR # BLD AUTO: 3.45 10*3/MM3 (ref 3.4–10.8)

## 2025-06-17 PROCEDURE — 83690 ASSAY OF LIPASE: CPT | Performed by: PHYSICIAN ASSISTANT

## 2025-06-17 PROCEDURE — 82948 REAGENT STRIP/BLOOD GLUCOSE: CPT

## 2025-06-17 PROCEDURE — 96374 THER/PROPH/DIAG INJ IV PUSH: CPT

## 2025-06-17 PROCEDURE — 25510000001 IOPAMIDOL PER 1 ML: Performed by: PHYSICIAN ASSISTANT

## 2025-06-17 PROCEDURE — 81003 URINALYSIS AUTO W/O SCOPE: CPT | Performed by: PHYSICIAN ASSISTANT

## 2025-06-17 PROCEDURE — 99285 EMERGENCY DEPT VISIT HI MDM: CPT

## 2025-06-17 PROCEDURE — 74177 CT ABD & PELVIS W/CONTRAST: CPT

## 2025-06-17 PROCEDURE — 36415 COLL VENOUS BLD VENIPUNCTURE: CPT

## 2025-06-17 PROCEDURE — 85025 COMPLETE CBC W/AUTO DIFF WBC: CPT | Performed by: PHYSICIAN ASSISTANT

## 2025-06-17 PROCEDURE — 80053 COMPREHEN METABOLIC PANEL: CPT | Performed by: PHYSICIAN ASSISTANT

## 2025-06-17 PROCEDURE — 25010000002 ONDANSETRON PER 1 MG: Performed by: PHYSICIAN ASSISTANT

## 2025-06-17 RX ORDER — ONDANSETRON 8 MG/1
8 TABLET, FILM COATED ORAL EVERY 8 HOURS PRN
Qty: 20 TABLET | Refills: 0 | Status: SHIPPED | OUTPATIENT
Start: 2025-06-17

## 2025-06-17 RX ORDER — IOPAMIDOL 755 MG/ML
100 INJECTION, SOLUTION INTRAVASCULAR
Status: COMPLETED | OUTPATIENT
Start: 2025-06-17 | End: 2025-06-17

## 2025-06-17 RX ORDER — ONDANSETRON 2 MG/ML
8 INJECTION INTRAMUSCULAR; INTRAVENOUS ONCE
Status: COMPLETED | OUTPATIENT
Start: 2025-06-17 | End: 2025-06-17

## 2025-06-17 RX ADMIN — IOPAMIDOL 100 ML: 755 INJECTION, SOLUTION INTRAVENOUS at 13:38

## 2025-06-17 RX ADMIN — ONDANSETRON 8 MG: 2 INJECTION, SOLUTION INTRAMUSCULAR; INTRAVENOUS at 13:49

## 2025-06-17 NOTE — ED PROVIDER NOTES
Subjective   History of Present Illness  74-year-old male presents emergency room with nausea vomiting diarrhea has been going off-and-on for the past several weeks went to the urgent care earlier today and advised for ER visit for complete workup.  Patient denies fever but does admit to chills and fatigue.        Review of Systems   Constitutional:  Positive for chills and fatigue. Negative for fever.   Respiratory:  Negative for chest tightness.    Cardiovascular:  Negative for chest pain and palpitations.   Gastrointestinal:  Positive for abdominal pain, diarrhea, nausea and vomiting.   Genitourinary:  Negative for dysuria, frequency and hematuria.   Musculoskeletal:  Negative for back pain.   Neurological:  Negative for dizziness and light-headedness.       Past Medical History:   Diagnosis Date    Coronary artery disease     Coronary artery disease involving native heart without angina pectoris 4/15/2020    Essential hypertension 4/15/2020    Hyperlipidemia     Hyperlipidemia LDL goal <100 4/15/2020    Hypertension     Myocardial infarction        No Known Allergies    Past Surgical History:   Procedure Laterality Date    CARDIAC CATHETERIZATION      CORONARY ARTERY BYPASS GRAFT      4 vessel, 2005    SINUS SURGERY         Family History   Problem Relation Age of Onset    Heart disease Father        Social History     Socioeconomic History    Marital status:    Tobacco Use    Smoking status: Never     Passive exposure: Past    Smokeless tobacco: Never   Vaping Use    Vaping status: Never Used   Substance and Sexual Activity    Alcohol use: Not Currently    Drug use: Never    Sexual activity: Defer           Objective   Physical Exam  Vitals and nursing note reviewed.   Constitutional:       General: He is not in acute distress.     Appearance: He is well-developed. He is not ill-appearing, toxic-appearing or diaphoretic.   HENT:      Head: Normocephalic and atraumatic.   Cardiovascular:      Rate and  Rhythm: Normal rate and regular rhythm.   Pulmonary:      Effort: Pulmonary effort is normal.      Breath sounds: Normal breath sounds.   Abdominal:      General: Abdomen is flat. Bowel sounds are normal.      Palpations: Abdomen is soft.      Tenderness: There is generalized abdominal tenderness. There is no guarding or rebound.   Skin:     General: Skin is warm and dry.   Neurological:      General: No focal deficit present.      Mental Status: He is alert and oriented to person, place, and time.   Psychiatric:         Mood and Affect: Mood normal.         Behavior: Behavior normal.         Procedures           ED Course                                                       Medical Decision Making  Amount and/or Complexity of Data Reviewed  Labs: ordered.  Radiology: ordered.    Risk  Prescription drug management.        Final diagnoses:   None       ED Disposition  ED Disposition       None            No follow-up provider specified.       Medication List      No changes were made to your prescriptions during this visit.          Normal   URINALYSIS W/ CULTURE IF INDICATED - Normal    Narrative:     In absence of clinical symptoms, the presence of pyuria, bacteria, and/or nitrites on the urinalysis result does not correlate with infection.  Urine microscopic not indicated.   CBC AND DIFFERENTIAL    Narrative:     The following orders were created for panel order CBC & Differential.  Procedure                               Abnormality         Status                     ---------                               -----------         ------                     CBC Auto Differential[[id number]]        Abnormal            Final result                 Please view results for these tests on the individual orders.     Medications   iopamidol (ISOVUE-370) 76 % injection 100 mL (100 mL Intravenous Given 6/17/25 1338)   ondansetron (ZOFRAN) injection 8 mg (8 mg Intravenous Given 6/17/25 1349)     No radiology results for the last day                                                     Medical Decision Making  74-year-old male presents emergency room complaints of nausea vomiting diarrhea lower abdominal pain for the past few weeks patient states his symptoms are consistent not progressively getting worse nor they are getting better.  Patient went to the urgent care today advised ER visit for abdominal discomfort.  Patient denies any fevers or chills dysuria frequent urination or hematuria.  Physical exam HEENT is normal, lungs are clear to auscultation, heart regular rate and rhythm, abdomen soft nontender nondistended normal bowel sounds all 4 quadrants.  Extremities warm and dry noncyanotic nonedematous.  Vital signs BP is 133/72 temperature 98.5 heart rate 63 respirations 17 SpO2 room air is 96%.  ER course: CT abdomen pelvis with IV contrast per radiologist shows no acute process demonstrated CBC was normal with a white count of 3.45 hemoglobin 14.9 hematocrit of 45.3 platelets of 166.  Urinalysis was normal.  Lipase was 18 normal.  CMP glucose is  slightly elevated at 120 BUN 14.2 creatinine 1.02 sodium 136 potassium 3.7  AST 34 alk phos is 66.  Patient was given Zofran 8 mg IV for nausea vomiting in ER setting with good relief explained to him that the workup was normal and there was no acute cause nor emergent cause of abdominal pain or nausea vomiting diarrhea there is no diverticulitis no pancreatitis no urinary tract infections noted.  Patient verbalized understanding will follow-up with gastroenterologist and family doctor prescription for Zofran sent he was discharged home in stable condition.    Problems Addressed:  Lower abdominal pain: complicated acute illness or injury  Nausea vomiting and diarrhea: complicated acute illness or injury    Amount and/or Complexity of Data Reviewed  Labs: ordered. Decision-making details documented in ED Course.  Radiology: ordered. Decision-making details documented in ED Course.    Risk  Prescription drug management.        Final diagnoses:   Lower abdominal pain   Nausea vomiting and diarrhea       ED Disposition  ED Disposition       ED Disposition   Discharge    Condition   Stable    Comment   --               [facility] [place] [id number] [facility] [address] [phone]  Schedule an appointment as soon as possible for a visit       Jamal Ladd PA-C  [address] [facility] [place] [address]  [phone]    Schedule an appointment as soon as possible for a visit            Medication List        New Prescriptions      ondansetron 8 MG tablet  Commonly known as: ZOFRAN  Take 1 tablet by mouth Every 8 (Eight) Hours As Needed for Nausea or Vomiting.               Where to Get Your Medications        These medications were sent to [facility] [id number] - [facility] [place] - [address] [facility] - [phone]  - [phone] FX  [address] [facility] [place] [address]      Phone: [phone]   ondansetron 8 MG tablet            Wil Hatch PA-C  07/01/25  [id number]

## 2025-06-23 ENCOUNTER — PATIENT OUTREACH (OUTPATIENT)
Dept: CASE MANAGEMENT | Facility: OTHER | Age: 75
End: 2025-06-23
Payer: MEDICARE

## 2025-06-23 NOTE — OUTREACH NOTE
AMBULATORY CASE MANAGEMENT NOTE    Names and Relationships of Patient/Support Persons: Contact: Jose Daniel Kerns; Relationship: Self -     Patient Outreach    Pt discharged from [facility] ED on 6/17/25, seen for abd pain. RN-ACM outreach call made to pt. Explained role of RN-ACM and reason for call. Pt reports improvement in symptoms. Reviewed ED AVS with pt. Education provided. Pt states he has follow up appt scheduled with his GI doctor. No questions per pt. Unable to further outreach, pt thanks RN-ACM for calling and ended call. Advised him to call with any needs. Follow up outreach prn.       Micky FRANCISCO  Ambulatory Case Management    6/23/2025, 09:57 EDT

## 2025-08-05 RX ORDER — METOPROLOL SUCCINATE 50 MG/1
50 TABLET, EXTENDED RELEASE ORAL DAILY
Qty: 90 TABLET | Refills: 3 | Status: SHIPPED | OUTPATIENT
Start: 2025-08-05

## 2025-08-19 ENCOUNTER — OFFICE (OUTPATIENT)
Dept: URBAN - METROPOLITAN AREA CLINIC 64 | Facility: CLINIC | Age: 75
End: 2025-08-19
Payer: MEDICARE

## 2025-08-19 VITALS
SYSTOLIC BLOOD PRESSURE: 147 MMHG | WEIGHT: 185 LBS | HEIGHT: 66 IN | DIASTOLIC BLOOD PRESSURE: 68 MMHG | HEART RATE: 57 BPM

## 2025-08-19 DIAGNOSIS — R11.2 NAUSEA WITH VOMITING, UNSPECIFIED: ICD-10-CM

## 2025-08-19 DIAGNOSIS — E66.3 OVERWEIGHT: ICD-10-CM

## 2025-08-19 DIAGNOSIS — R53.83 OTHER FATIGUE: ICD-10-CM

## 2025-08-19 DIAGNOSIS — R10.9 UNSPECIFIED ABDOMINAL PAIN: ICD-10-CM

## 2025-08-19 PROCEDURE — 99204 OFFICE O/P NEW MOD 45 MIN: CPT | Performed by: INTERNAL MEDICINE

## 2025-08-20 LAB
CBC WITH DIFFERENTIAL/PLATELET: BASO (ABSOLUTE): 0 X10E3/UL (ref 0–0.2)
CBC WITH DIFFERENTIAL/PLATELET: BASOS: 0 %
CBC WITH DIFFERENTIAL/PLATELET: EOS (ABSOLUTE): 0.1 X10E3/UL (ref 0–0.4)
CBC WITH DIFFERENTIAL/PLATELET: EOS: 2 %
CBC WITH DIFFERENTIAL/PLATELET: HEMATOCRIT: 43.4 % (ref 37.5–51)
CBC WITH DIFFERENTIAL/PLATELET: HEMATOLOGY COMMENTS: (no result)
CBC WITH DIFFERENTIAL/PLATELET: HEMOGLOBIN: 14 G/DL (ref 13–17.7)
CBC WITH DIFFERENTIAL/PLATELET: IMMATURE CELLS: (no result)
CBC WITH DIFFERENTIAL/PLATELET: IMMATURE GRANS (ABS): 0 X10E3/UL (ref 0–0.1)
CBC WITH DIFFERENTIAL/PLATELET: IMMATURE GRANULOCYTES: 0 %
CBC WITH DIFFERENTIAL/PLATELET: LYMPHS (ABSOLUTE): 3.1 X10E3/UL (ref 0.7–3.1)
CBC WITH DIFFERENTIAL/PLATELET: LYMPHS: 48 %
CBC WITH DIFFERENTIAL/PLATELET: MCH: 29.2 PG (ref 26.6–33)
CBC WITH DIFFERENTIAL/PLATELET: MCHC: 32.3 G/DL (ref 31.5–35.7)
CBC WITH DIFFERENTIAL/PLATELET: MCV: 91 FL (ref 79–97)
CBC WITH DIFFERENTIAL/PLATELET: MONOCYTES(ABSOLUTE): 0.4 X10E3/UL (ref 0.1–0.9)
CBC WITH DIFFERENTIAL/PLATELET: MONOCYTES: 7 %
CBC WITH DIFFERENTIAL/PLATELET: NEUTROPHILS (ABSOLUTE): 2.8 X10E3/UL (ref 1.4–7)
CBC WITH DIFFERENTIAL/PLATELET: NEUTROPHILS: 43 %
CBC WITH DIFFERENTIAL/PLATELET: NRBC: (no result)
CBC WITH DIFFERENTIAL/PLATELET: PLATELETS: 191 X10E3/UL (ref 150–450)
CBC WITH DIFFERENTIAL/PLATELET: RBC: 4.79 X10E6/UL (ref 4.14–5.8)
CBC WITH DIFFERENTIAL/PLATELET: RDW: 13 % (ref 11.6–15.4)
CBC WITH DIFFERENTIAL/PLATELET: WBC: 6.5 X10E3/UL (ref 3.4–10.8)

## 2025-08-22 ENCOUNTER — TELEPHONE (OUTPATIENT)
Dept: CARDIOLOGY | Facility: CLINIC | Age: 75
End: 2025-08-22
Payer: MEDICARE

## 2025-08-23 LAB — CALPROTECTIN, FECAL: 12 UG/G (ref 0–120)
